# Patient Record
Sex: FEMALE | Race: BLACK OR AFRICAN AMERICAN | Employment: UNEMPLOYED | URBAN - METROPOLITAN AREA
[De-identification: names, ages, dates, MRNs, and addresses within clinical notes are randomized per-mention and may not be internally consistent; named-entity substitution may affect disease eponyms.]

---

## 2017-02-02 ENCOUNTER — HOSPITAL ENCOUNTER (EMERGENCY)
Age: 42
Discharge: HOME | End: 2017-02-02
Attending: EMERGENCY MEDICINE
Payer: MEDICAID

## 2017-02-02 ENCOUNTER — APPOINTMENT (OUTPATIENT)
Dept: GENERAL RADIOLOGY | Age: 42
End: 2017-02-02
Attending: EMERGENCY MEDICINE
Payer: MEDICAID

## 2017-02-02 VITALS
SYSTOLIC BLOOD PRESSURE: 161 MMHG | OXYGEN SATURATION: 99 % | HEART RATE: 99 BPM | DIASTOLIC BLOOD PRESSURE: 134 MMHG | WEIGHT: 212 LBS | HEIGHT: 67 IN | BODY MASS INDEX: 33.27 KG/M2 | RESPIRATION RATE: 11 BRPM | TEMPERATURE: 98.6 F

## 2017-02-02 DIAGNOSIS — R73.9 HYPERGLYCEMIA: ICD-10-CM

## 2017-02-02 DIAGNOSIS — R07.89 ATYPICAL CHEST PAIN: Primary | ICD-10-CM

## 2017-02-02 LAB
ANION GAP BLD CALC-SCNC: 12 MMOL/L (ref 5–15)
BASOPHILS # BLD AUTO: 0 K/UL (ref 0–0.1)
BASOPHILS # BLD: 0 % (ref 0–1)
BUN SERPL-MCNC: 9 MG/DL (ref 6–20)
BUN/CREAT SERPL: 10 (ref 12–20)
CALCIUM SERPL-MCNC: 9.7 MG/DL (ref 8.5–10.1)
CHLORIDE SERPL-SCNC: 105 MMOL/L (ref 97–108)
CO2 SERPL-SCNC: 26 MMOL/L (ref 21–32)
CREAT SERPL-MCNC: 0.88 MG/DL (ref 0.55–1.02)
D DIMER PPP FEU-MCNC: 0.19 MG/L FEU (ref 0–0.65)
EOSINOPHIL # BLD: 0.1 K/UL (ref 0–0.4)
EOSINOPHIL NFR BLD: 1 % (ref 0–7)
ERYTHROCYTE [DISTWIDTH] IN BLOOD BY AUTOMATED COUNT: 13.8 % (ref 11.5–14.5)
GLUCOSE SERPL-MCNC: 408 MG/DL (ref 65–100)
HCT VFR BLD AUTO: 35.3 % (ref 35–47)
HGB BLD-MCNC: 12.2 G/DL (ref 11.5–16)
LYMPHOCYTES # BLD AUTO: 28 % (ref 12–49)
LYMPHOCYTES # BLD: 2 K/UL (ref 0.8–3.5)
MCH RBC QN AUTO: 29.2 PG (ref 26–34)
MCHC RBC AUTO-ENTMCNC: 34.6 G/DL (ref 30–36.5)
MCV RBC AUTO: 84.4 FL (ref 80–99)
MONOCYTES # BLD: 0.4 K/UL (ref 0–1)
MONOCYTES NFR BLD AUTO: 6 % (ref 5–13)
NEUTS SEG # BLD: 4.6 K/UL (ref 1.8–8)
NEUTS SEG NFR BLD AUTO: 65 % (ref 32–75)
PLATELET # BLD AUTO: 229 K/UL (ref 150–400)
POTASSIUM SERPL-SCNC: 3.7 MMOL/L (ref 3.5–5.1)
RBC # BLD AUTO: 4.18 M/UL (ref 3.8–5.2)
SODIUM SERPL-SCNC: 143 MMOL/L (ref 136–145)
TROPONIN I SERPL-MCNC: <0.04 NG/ML
WBC # BLD AUTO: 7.1 K/UL (ref 3.6–11)

## 2017-02-02 PROCEDURE — 74011250637 HC RX REV CODE- 250/637: Performed by: EMERGENCY MEDICINE

## 2017-02-02 PROCEDURE — 80048 BASIC METABOLIC PNL TOTAL CA: CPT | Performed by: EMERGENCY MEDICINE

## 2017-02-02 PROCEDURE — 85379 FIBRIN DEGRADATION QUANT: CPT | Performed by: EMERGENCY MEDICINE

## 2017-02-02 PROCEDURE — 71020 XR CHEST PA LAT: CPT

## 2017-02-02 PROCEDURE — 84484 ASSAY OF TROPONIN QUANT: CPT | Performed by: EMERGENCY MEDICINE

## 2017-02-02 PROCEDURE — 85025 COMPLETE CBC W/AUTO DIFF WBC: CPT | Performed by: EMERGENCY MEDICINE

## 2017-02-02 PROCEDURE — 36600 WITHDRAWAL OF ARTERIAL BLOOD: CPT

## 2017-02-02 PROCEDURE — 36415 COLL VENOUS BLD VENIPUNCTURE: CPT | Performed by: EMERGENCY MEDICINE

## 2017-02-02 PROCEDURE — 99284 EMERGENCY DEPT VISIT MOD MDM: CPT

## 2017-02-02 PROCEDURE — 93005 ELECTROCARDIOGRAM TRACING: CPT

## 2017-02-02 RX ORDER — SODIUM CHLORIDE 0.9 % (FLUSH) 0.9 %
5-10 SYRINGE (ML) INJECTION AS NEEDED
Status: DISCONTINUED | OUTPATIENT
Start: 2017-02-02 | End: 2017-02-02 | Stop reason: HOSPADM

## 2017-02-02 RX ORDER — SODIUM CHLORIDE 0.9 % (FLUSH) 0.9 %
5-10 SYRINGE (ML) INJECTION EVERY 8 HOURS
Status: DISCONTINUED | OUTPATIENT
Start: 2017-02-02 | End: 2017-02-02 | Stop reason: HOSPADM

## 2017-02-02 RX ORDER — ACETAMINOPHEN 500 MG
1000 TABLET ORAL
Status: COMPLETED | OUTPATIENT
Start: 2017-02-02 | End: 2017-02-02

## 2017-02-02 RX ADMIN — ACETAMINOPHEN 1000 MG: 500 TABLET ORAL at 10:47

## 2017-02-02 NOTE — ED TRIAGE NOTES
Patient arrived by ems c/o chest pain and sob that is chronic by worse this morning, patient seed in an ed on sat for the same, dx pericarditis, has not f/u with pcp. Patient is resting in bed, bed in low position, call bell in reach, monitor x3, son at bedside.

## 2017-02-02 NOTE — DISCHARGE INSTRUCTIONS
Chest Pain: Care Instructions  Your Care Instructions  There are many things that can cause chest pain. Some are not serious and will get better on their own in a few days. But some kinds of chest pain need more testing and treatment. Your doctor may have recommended a follow-up visit in the next 8 to 12 hours. If you are not getting better, you may need more tests or treatment. Even though your doctor has released you, you still need to watch for any problems. The doctor carefully checked you, but sometimes problems can develop later. If you have new symptoms or if your symptoms do not get better, get medical care right away. If you have worse or different chest pain or pressure that lasts more than 5 minutes or you passed out (lost consciousness), call 911 or seek other emergency help right away. A medical visit is only one step in your treatment. Even if you feel better, you still need to do what your doctor recommends, such as going to all suggested follow-up appointments and taking medicines exactly as directed. This will help you recover and help prevent future problems. How can you care for yourself at home? · Rest until you feel better. · Take your medicine exactly as prescribed. Call your doctor if you think you are having a problem with your medicine. · Do not drive after taking a prescription pain medicine. When should you call for help? Call 911 if:  · You passed out (lost consciousness). · You have severe difficulty breathing. · You have symptoms of a heart attack. These may include:  ¨ Chest pain or pressure, or a strange feeling in your chest.  ¨ Sweating. ¨ Shortness of breath. ¨ Nausea or vomiting. ¨ Pain, pressure, or a strange feeling in your back, neck, jaw, or upper belly or in one or both shoulders or arms. ¨ Lightheadedness or sudden weakness. ¨ A fast or irregular heartbeat.   After you call 911, the  may tell you to chew 1 adult-strength or 2 to 4 low-dose aspirin. Wait for an ambulance. Do not try to drive yourself. Call your doctor today if:  · You have any trouble breathing. · Your chest pain gets worse. · You are dizzy or lightheaded, or you feel like you may faint. · You are not getting better as expected. · You are having new or different chest pain. Where can you learn more? Go to http://adamaris-liana.info/. Enter A120 in the search box to learn more about \"Chest Pain: Care Instructions. \"  Current as of: May 27, 2016  Content Version: 11.1  © 6879-1563 [employer]. Care instructions adapted under license by [employer] (which disclaims liability or warranty for this information). If you have questions about a medical condition or this instruction, always ask your healthcare professional. [address] any warranty or liability for your use of this information. Learning About High Blood Sugar  What is high blood sugar? Your body turns the food you eat into glucose (sugar), which it uses for energy. But if your body isn't able to use the sugar right away, it can build up in your blood and lead to high blood sugar. When the amount of sugar in your blood stays too high for too much of the time, you may have diabetes. Diabetes is a disease that can cause serious health problems. The good news is that lifestyle changes may help you get your blood sugar back to normal and avoid or delay diabetes. What causes high blood sugar? Sugar (glucose) can build up in your blood if you:  · Are overweight. · Have a family history of diabetes. · Take certain medicines, such as steroids. What are the symptoms? Having high blood sugar may not cause any symptoms at all. Or it may make you feel very thirsty or very hungry. You may also urinate more often than usual, have blurry vision, or lose weight without trying. How is high blood sugar treated?   You can take steps to lower your blood sugar level if you understand what makes it get higher. Your doctor may want you to learn how to test your blood sugar level at home. Then you can see how illness, stress, or different kinds of food or medicine raise or lower your blood sugar level. Other tests may be needed to see if you have diabetes. How can you prevent high blood sugar? · Watch your weight. If you're overweight, losing just a small amount of weight may help. Reducing fat around your waist is most important. · Limit the amount of calories, sweets, and unhealthy fat you eat. Ask your doctor if a dietitian can help you. A registered dietitian can help you create meal plans that fit your lifestyle. · Get at least 30 minutes of exercise on most days of the week. Exercise helps control your blood sugar. It also helps you maintain a healthy weight. Walking is a good choice. You also may want to do other activities, such as running, swimming, cycling, or playing tennis or team sports. · If your doctor prescribed medicines, take them exactly as prescribed. Call your doctor if you think you are having a problem with your medicine. You will get more details on the specific medicines your doctor prescribes. Follow-up care is a key part of your treatment and safety. Be sure to make and go to all appointments, and call your doctor if you are having problems. It's also a good idea to know your test results and keep a list of the medicines you take. Where can you learn more? Go to http://adamaris-liana.info/. Enter O108 in the search box to learn more about \"Learning About High Blood Sugar. \"  Current as of: May 23, 2016  Content Version: 11.1  © 2527-2756 [employer]. Care instructions adapted under license by [employer] (which disclaims liability or warranty for this information).  If you have questions about a medical condition or this instruction, always ask your healthcare professional. Rigo Alcaraz disclaims any warranty or liability for your use of this information. We hope that we have addressed all of your medical concerns. The examination and treatment you received in the Emergency Department were for an emergent problem and were not intended as complete care. It is important that you follow up with your healthcare provider(s) for ongoing care. If your symptoms worsen or do not improve as expected, and you are unable to reach your usual health care provider(s), you should return to the Emergency Department. Today's healthcare is undergoing tremendous change, and patient satisfaction surveys are one of the many tools to assess the quality of medical care. You may receive a survey from the [employer] regarding your experience in the Emergency Department. I hope that your experience has been completely positive, particularly the medical care that I provided. As such, please participate in the survey; anything less than excellent does not meet my expectations or intentions. [facility] and [address] [place] [address] participate in nationally recognized quality of care measures. If your blood pressure is greater than 120/80, as reported below, we urge that you seek medical care to address the potential of high blood pressure, commonly known as hypertension. Hypertension can be hereditary or can be caused by certain medical conditions, pain, stress, or \"white coat syndrome. \"       Please make an appointment with your health care provider(s) for follow up of your Emergency Department visit. VITALS:   Patient Vitals for the past 8 hrs:   Temp Pulse Resp BP SpO2   02/02/17 1045 - (!) 103 14 (!) 168/116 96 %   02/02/17 0859 98.6 °F (37 °C) 99 16 (!) 136/108 98 %          Thank you for allowing us to provide you with medical care today. We realize that you have many choices for your emergency care needs.   Please choose us in the future for any continued health care needs. Regards,           Master LEIVA Edilia Keenan, [address] [facility] [address]   Office: [phone]            Recent Results (from the past 24 hour(s))   METABOLIC PANEL, BASIC    Collection Time: 02/02/17 10:10 AM   Result Value Ref Range    Sodium 143 136 - 145 mmol/L    Potassium 3.7 3.5 - 5.1 mmol/L    Chloride 105 97 - 108 mmol/L    CO2 26 21 - 32 mmol/L    Anion gap 12 5 - 15 mmol/L    Glucose 408 (H) 65 - 100 mg/dL    BUN 9 6 - 20 MG/DL    Creatinine 0.88 0.55 - 1.02 MG/DL    BUN/Creatinine ratio 10 (L) 12 - 20      GFR est AA >60 >60 ml/min/1.73m2    GFR est non-AA >60 >60 ml/min/1.73m2    Calcium 9.7 8.5 - 10.1 MG/DL   TROPONIN I    Collection Time: 02/02/17 10:10 AM   Result Value Ref Range    Troponin-I, Qt. <0.04 <0.05 ng/mL   D DIMER    Collection Time: 02/02/17 10:10 AM   Result Value Ref Range    D-dimer 0.19 0.00 - 0.65 mg/L FEU   CBC WITH AUTOMATED DIFF    Collection Time: 02/02/17 10:10 AM   Result Value Ref Range    WBC 7.1 3.6 - 11.0 K/uL    RBC 4.18 3.80 - 5.20 M/uL    HGB 12.2 11.5 - 16.0 g/dL    HCT 35.3 35.0 - 47.0 %    MCV 84.4 80.0 - 99.0 FL    MCH 29.2 26.0 - 34.0 PG    MCHC 34.6 30.0 - 36.5 g/dL    RDW 13.8 11.5 - 14.5 %    PLATELET 185 161 - 106 K/uL    NEUTROPHILS 65 32 - 75 %    LYMPHOCYTES 28 12 - 49 %    MONOCYTES 6 5 - 13 %    EOSINOPHILS 1 0 - 7 %    BASOPHILS 0 0 - 1 %    ABS. NEUTROPHILS 4.6 1.8 - 8.0 K/UL    ABS. LYMPHOCYTES 2.0 0.8 - 3.5 K/UL    ABS. MONOCYTES 0.4 0.0 - 1.0 K/UL    ABS. EOSINOPHILS 0.1 0.0 - 0.4 K/UL    ABS. BASOPHILS 0.0 0.0 - 0.1 K/UL       Xr Chest Pa Lat    Result Date: 2/2/2017  INDICATION: Left chest pain since 4:30 this morning COMPARISON: 5/23/2016 FINDINGS: AP and lateral views of the chest demonstrate a stable cardiomediastinal silhouette and clear lungs bilaterally. The visualized osseous structures are unremarkable. IMPRESSION: No acute process.

## 2017-02-02 NOTE — ED NOTES
Dr. Ned Razo reviewed discharge instructions with the patient. The patient verbalized understanding. All questions and concerns were addressed. Arranging transportation for the patient and her son back home.

## 2017-02-02 NOTE — ED NOTES
The patient is discharged ambulatory in the care of family members with instructions and prescriptions in hand. Pt is alert and oriented x 4. Respirations are clear and unlabored. Patient will wait for logisticare ride in the waiting area.

## 2017-02-02 NOTE — ED PROVIDER NOTES
HPI Comments: 46 y.o. female with past medical history significant for diabetes, HTN, morbid obesity, chronic pain, bronchitis, arthritis, GERD, anxiety, CAD, fibromyalgia, DVT, pericarditis who presents via EMS with chief complaint of chest pain. Pt complains of L-sided anterior chest pain and SOB that began at 0430 this morning (4.5 hours ago). Pt states that pain radiates into L-arm and is exacerbate with movement. Additional sx inlcude dizziness and vomiting (x1). Pt mentions h/o chest pain, noting that she chronically experiences constant mild CP. However, her current sx are much worse than usual. Pt denies recent heavy lifting. Pt mentions h/o anxiety and depression; she denies being more depressed/anxious than usual. Pt denies cough, fever. There are no other acute medical concerns at this time. PCP: NANCY Parnell    Note written by Nora Light, as dictated by Reese Horn MD 8:56 AM      The history is provided by the patient. No  was used.         Past Medical History:   Diagnosis Date    Anxiety     Arthritis     Autoimmune disease ([address]     Bronchitis     CAD (coronary artery disease)     Chronic pain      fibromyalgia    Diabetes ([address]     Fibromyalgia     GERD (gastroesophageal reflux disease)     H/O pericarditis     History of continuous positive airway pressure (CPAP) therapy     Hypertension     Morbid obesity ([address]     Other ill-defined conditions(799.89)      sleep apnea    Thromboembolus ([address]      2010 Leg DVT    Unspecified sleep apnea      cpap       Past Surgical History:   Procedure Laterality Date    Hx anai and bso  2005     hysterectomy    Hx heent  2009     tonsillectomy    Hx orthopaedic  2010     right knee patellar surgery with hardware         Family History:   Problem Relation Age of Onset    Diabetes Mother     Hypertension Mother     Diabetes Father     Hypertension Father     Diabetes Sister     Diabetes Sister    Verlinda Smoker Heart Disease Mother     Heart Disease Father     Hypertension Sister        Social History     Social History    Marital status:      Spouse name: N/A    Number of children: N/A    Years of education: N/A     Occupational History    Not on file. Social History Main Topics    Smoking status: Never Smoker    Smokeless tobacco: Never Used    Alcohol use No    Drug use: No    Sexual activity: Not Currently     Other Topics Concern    Not on file     Social History Narrative         ALLERGIES: Aspirin and Nsaids (non-steroidal anti-inflammatory drug)    Review of Systems   Constitutional: Negative. Negative for appetite change, fever and unexpected weight change. HENT: Negative. Negative for ear pain, hearing loss, nosebleeds, rhinorrhea, sore throat and trouble swallowing. Respiratory: Positive for shortness of breath. Negative for cough and chest tightness. Cardiovascular: Positive for chest pain (Lsided). Negative for palpitations. Gastrointestinal: Positive for nausea and vomiting. Negative for abdominal distention, abdominal pain and blood in stool. Endocrine: Negative. Genitourinary: Negative for dysuria and hematuria. Musculoskeletal: Negative. Negative for back pain and myalgias. Skin: Negative. Negative for rash. Allergic/Immunologic: Negative. Neurological: Positive for dizziness. Negative for syncope, weakness and numbness. Hematological: Negative. Psychiatric/Behavioral: Negative. All other systems reviewed and are negative. There were no vitals filed for this visit. Physical Exam   Constitutional: She is oriented to person, place, and time. She appears well-developed and well-nourished. No distress. obese   HENT:   Head: Normocephalic and atraumatic.    Right Ear: External ear normal.   Left Ear: External ear normal.   Nose: Nose normal.   Mouth/Throat: Oropharynx is clear and moist.   Eyes: Conjunctivae and EOM are normal. Pupils are equal, round, and reactive to light. Neck: Normal range of motion. Neck supple. No JVD present. No thyromegaly present. Cardiovascular: Normal rate, regular rhythm, normal heart sounds and intact distal pulses. No murmur heard. Pulmonary/Chest: Effort normal and breath sounds normal. No respiratory distress. She has no wheezes. She has no rales. Abdominal: Soft. Bowel sounds are normal. She exhibits no distension. There is no tenderness. Musculoskeletal: Normal range of motion. She exhibits tenderness. She exhibits no edema. Right lower leg: She exhibits no tenderness and no swelling. Left lower leg: She exhibits no tenderness and no swelling. Mildly reproducible chest wall tenderness above L-breast.    Neurological: She is alert and oriented to person, place, and time. No cranial nerve deficit. Skin: Skin is warm and dry. No rash noted. Psychiatric: Her behavior is normal. Thought content normal. Her mood appears anxious. Note written by Mandy Card. Charla Golden, as dictated by Lois Adan MD 8:56 AM         [facility]  ED Course       Procedures      ED EKG interpretation:  Rhythm: normal sinus rhythm; and regular . Rate (approx.): 89; Axis: normal; ST/T wave: normal; no ectopy  Note written by Rock Jessica Golden, as dictated by Lois Adan MD 8:57 AM    PROGRESS NOTE:  11:03 AM  Pt has elevated BG of 408 with no signs of acidosis. Troponin is negative; D-dimer is negative; CBC is unremarkable; CXR is normal.  Assessment: Pt has atypical chest pain and hyperglycemia. Will d/c home with no prescriptions for pain; pt should f/u with PCP and perform frequent BG checks.

## 2017-02-03 LAB
ATRIAL RATE: 89 BPM
CALCULATED P AXIS, ECG09: 42 DEGREES
CALCULATED R AXIS, ECG10: 10 DEGREES
CALCULATED T AXIS, ECG11: 25 DEGREES
DIAGNOSIS, 93000: NORMAL
P-R INTERVAL, ECG05: 138 MS
Q-T INTERVAL, ECG07: 368 MS
QRS DURATION, ECG06: 88 MS
QTC CALCULATION (BEZET), ECG08: 447 MS
VENTRICULAR RATE, ECG03: 89 BPM

## 2017-03-24 ENCOUNTER — HOSPITAL ENCOUNTER (EMERGENCY)
Age: 42
Discharge: HOME | End: 2017-03-24
Attending: EMERGENCY MEDICINE
Payer: MEDICAID

## 2017-03-24 ENCOUNTER — APPOINTMENT (OUTPATIENT)
Dept: GENERAL RADIOLOGY | Age: 42
End: 2017-03-24
Attending: NURSE PRACTITIONER
Payer: MEDICAID

## 2017-03-24 VITALS
RESPIRATION RATE: 14 BRPM | HEIGHT: 67 IN | BODY MASS INDEX: 33.27 KG/M2 | SYSTOLIC BLOOD PRESSURE: 120 MMHG | WEIGHT: 212 LBS | DIASTOLIC BLOOD PRESSURE: 79 MMHG | HEART RATE: 106 BPM | OXYGEN SATURATION: 97 % | TEMPERATURE: 98.3 F

## 2017-03-24 DIAGNOSIS — R07.89 ATYPICAL CHEST PAIN: Primary | ICD-10-CM

## 2017-03-24 DIAGNOSIS — R73.9 HYPERGLYCEMIA: ICD-10-CM

## 2017-03-24 LAB
ALBUMIN SERPL BCP-MCNC: 3.7 G/DL (ref 3.5–5)
ALBUMIN/GLOB SERPL: 1 {RATIO} (ref 1.1–2.2)
ALP SERPL-CCNC: 137 U/L (ref 45–117)
ALT SERPL-CCNC: 23 U/L (ref 12–78)
ANION GAP BLD CALC-SCNC: 12 MMOL/L (ref 5–15)
APPEARANCE UR: CLEAR
AST SERPL W P-5'-P-CCNC: 14 U/L (ref 15–37)
ATRIAL RATE: 115 BPM
BACTERIA URNS QL MICRO: NEGATIVE /HPF
BASOPHILS # BLD AUTO: 0 K/UL (ref 0–0.1)
BASOPHILS # BLD: 0 % (ref 0–1)
BILIRUB SERPL-MCNC: 0.1 MG/DL (ref 0.2–1)
BILIRUB UR QL: NEGATIVE
BUN SERPL-MCNC: 14 MG/DL (ref 6–20)
BUN/CREAT SERPL: 12 (ref 12–20)
CALCIUM SERPL-MCNC: 9 MG/DL (ref 8.5–10.1)
CALCULATED P AXIS, ECG09: 38 DEGREES
CALCULATED R AXIS, ECG10: 0 DEGREES
CALCULATED T AXIS, ECG11: 6 DEGREES
CHLORIDE SERPL-SCNC: 101 MMOL/L (ref 97–108)
CK SERPL-CCNC: 114 U/L (ref 26–192)
CO2 SERPL-SCNC: 23 MMOL/L (ref 21–32)
COLOR UR: ABNORMAL
CREAT SERPL-MCNC: 1.21 MG/DL (ref 0.55–1.02)
D DIMER PPP FEU-MCNC: 0.34 MG/L FEU (ref 0–0.65)
DIAGNOSIS, 93000: NORMAL
DIFFERENTIAL METHOD BLD: NORMAL
EOSINOPHIL # BLD: 0 K/UL (ref 0–0.4)
EOSINOPHIL NFR BLD: 1 % (ref 0–7)
EPITH CASTS URNS QL MICRO: ABNORMAL /LPF
ERYTHROCYTE [DISTWIDTH] IN BLOOD BY AUTOMATED COUNT: 13.4 % (ref 11.5–14.5)
GLOBULIN SER CALC-MCNC: 3.8 G/DL (ref 2–4)
GLUCOSE BLD STRIP.AUTO-MCNC: 329 MG/DL (ref 65–100)
GLUCOSE SERPL-MCNC: 570 MG/DL (ref 65–100)
GLUCOSE UR STRIP.AUTO-MCNC: >1000 MG/DL
HCT VFR BLD AUTO: 39.2 % (ref 35–47)
HGB BLD-MCNC: 12.9 G/DL (ref 11.5–16)
HGB UR QL STRIP: NEGATIVE
KETONES UR QL STRIP.AUTO: NEGATIVE MG/DL
LEUKOCYTE ESTERASE UR QL STRIP.AUTO: NEGATIVE
LYMPHOCYTES # BLD AUTO: 30 % (ref 12–49)
LYMPHOCYTES # BLD: 1.4 K/UL (ref 0.8–3.5)
MCH RBC QN AUTO: 28.5 PG (ref 26–34)
MCHC RBC AUTO-ENTMCNC: 32.9 G/DL (ref 30–36.5)
MCV RBC AUTO: 86.7 FL (ref 80–99)
MONOCYTES # BLD: 0.3 K/UL (ref 0–1)
MONOCYTES NFR BLD AUTO: 6 % (ref 5–13)
NEUTS SEG # BLD: 3 K/UL (ref 1.8–8)
NEUTS SEG NFR BLD AUTO: 63 % (ref 32–75)
NITRITE UR QL STRIP.AUTO: NEGATIVE
P-R INTERVAL, ECG05: 130 MS
PH UR STRIP: 6 [PH] (ref 5–8)
PLATELET # BLD AUTO: 257 K/UL (ref 150–400)
POTASSIUM SERPL-SCNC: 4 MMOL/L (ref 3.5–5.1)
PROT SERPL-MCNC: 7.5 G/DL (ref 6.4–8.2)
PROT UR STRIP-MCNC: NEGATIVE MG/DL
Q-T INTERVAL, ECG07: 334 MS
QRS DURATION, ECG06: 90 MS
QTC CALCULATION (BEZET), ECG08: 462 MS
RBC # BLD AUTO: 4.52 M/UL (ref 3.8–5.2)
RBC #/AREA URNS HPF: ABNORMAL /HPF (ref 0–5)
SERVICE CMNT-IMP: ABNORMAL
SODIUM SERPL-SCNC: 136 MMOL/L (ref 136–145)
SP GR UR REFRACTOMETRY: 1.01 (ref 1–1.03)
TROPONIN I SERPL-MCNC: <0.04 NG/ML
UROBILINOGEN UR QL STRIP.AUTO: 0.2 EU/DL (ref 0.2–1)
VENTRICULAR RATE, ECG03: 115 BPM
WBC # BLD AUTO: 4.7 K/UL (ref 3.6–11)
WBC URNS QL MICRO: ABNORMAL /HPF (ref 0–4)

## 2017-03-24 PROCEDURE — 80053 COMPREHEN METABOLIC PANEL: CPT | Performed by: NURSE PRACTITIONER

## 2017-03-24 PROCEDURE — 99285 EMERGENCY DEPT VISIT HI MDM: CPT

## 2017-03-24 PROCEDURE — 96375 TX/PRO/DX INJ NEW DRUG ADDON: CPT

## 2017-03-24 PROCEDURE — 85379 FIBRIN DEGRADATION QUANT: CPT | Performed by: NURSE PRACTITIONER

## 2017-03-24 PROCEDURE — 96361 HYDRATE IV INFUSION ADD-ON: CPT

## 2017-03-24 PROCEDURE — 85025 COMPLETE CBC W/AUTO DIFF WBC: CPT | Performed by: NURSE PRACTITIONER

## 2017-03-24 PROCEDURE — 36415 COLL VENOUS BLD VENIPUNCTURE: CPT | Performed by: NURSE PRACTITIONER

## 2017-03-24 PROCEDURE — 82962 GLUCOSE BLOOD TEST: CPT

## 2017-03-24 PROCEDURE — 84484 ASSAY OF TROPONIN QUANT: CPT | Performed by: NURSE PRACTITIONER

## 2017-03-24 PROCEDURE — 82550 ASSAY OF CK (CPK): CPT | Performed by: NURSE PRACTITIONER

## 2017-03-24 PROCEDURE — 71020 XR CHEST PA LAT: CPT

## 2017-03-24 PROCEDURE — 74011250637 HC RX REV CODE- 250/637: Performed by: NURSE PRACTITIONER

## 2017-03-24 PROCEDURE — 81001 URINALYSIS AUTO W/SCOPE: CPT | Performed by: NURSE PRACTITIONER

## 2017-03-24 PROCEDURE — 93005 ELECTROCARDIOGRAM TRACING: CPT

## 2017-03-24 PROCEDURE — 96374 THER/PROPH/DIAG INJ IV PUSH: CPT

## 2017-03-24 PROCEDURE — 74011250636 HC RX REV CODE- 250/636: Performed by: NURSE PRACTITIONER

## 2017-03-24 RX ORDER — DIAZEPAM 5 MG/1
5 TABLET ORAL
Qty: 6 TAB | Refills: 0 | Status: ON HOLD | OUTPATIENT
Start: 2017-03-24 | End: 2017-04-04

## 2017-03-24 RX ORDER — ONDANSETRON 2 MG/ML
4 INJECTION INTRAMUSCULAR; INTRAVENOUS ONCE
Status: COMPLETED | OUTPATIENT
Start: 2017-03-24 | End: 2017-03-24

## 2017-03-24 RX ORDER — MORPHINE SULFATE 2 MG/ML
4 INJECTION, SOLUTION INTRAMUSCULAR; INTRAVENOUS
Status: COMPLETED | OUTPATIENT
Start: 2017-03-24 | End: 2017-03-24

## 2017-03-24 RX ORDER — DIAZEPAM 5 MG/1
5 TABLET ORAL
Status: COMPLETED | OUTPATIENT
Start: 2017-03-24 | End: 2017-03-24

## 2017-03-24 RX ADMIN — DIAZEPAM 5 MG: 5 TABLET ORAL at 13:25

## 2017-03-24 RX ADMIN — HUMAN INSULIN 10 UNITS: 100 INJECTION, SOLUTION SUBCUTANEOUS at 13:25

## 2017-03-24 RX ADMIN — SODIUM CHLORIDE 1000 ML: 900 INJECTION, SOLUTION INTRAVENOUS at 14:21

## 2017-03-24 RX ADMIN — SODIUM CHLORIDE 1000 ML: 900 INJECTION, SOLUTION INTRAVENOUS at 13:18

## 2017-03-24 RX ADMIN — ONDANSETRON 4 MG: 2 INJECTION INTRAMUSCULAR; INTRAVENOUS at 13:08

## 2017-03-24 RX ADMIN — Medication 4 MG: at 12:11

## 2017-03-24 NOTE — ED NOTES
Medicated for nausea and still states has chest pain 8/10. Morphine helped \"a little\". Will inform provider.

## 2017-03-24 NOTE — DISCHARGE INSTRUCTIONS
Musculoskeletal Chest Pain: Care Instructions  Your Care Instructions  Chest pain is not always a sign that something is wrong with your heart or that you have another serious problem. The doctor thinks your chest pain is caused by strained muscles or ligaments, inflamed chest cartilage, or another problem in your chest, rather than by your heart. You may need more tests to find the cause of your chest pain. Follow-up care is a key part of your treatment and safety. Be sure to make and go to all appointments, and call your doctor if you are having problems. Its also a good idea to know your test results and keep a list of the medicines you take. How can you care for yourself at home? · Take pain medicines exactly as directed. ¨ If the doctor gave you a prescription medicine for pain, take it as prescribed. ¨ If you are not taking a prescription pain medicine, ask your doctor if you can take an over-the-counter medicine. · Rest and protect the sore area. · Stop, change, or take a break from any activity that may be causing your pain or soreness. · Put ice or a cold pack on the sore area for 10 to 20 minutes at a time. Try to do this every 1 to 2 hours for the next 3 days (when you are awake) or until the swelling goes down. Put a thin cloth between the ice and your skin. · After 2 or 3 days, apply a heating pad set on low or a warm cloth to the area that hurts. Some doctors suggest that you go back and forth between hot and cold. · Do not wrap or tape your ribs for support. This may cause you to take smaller breaths, which could increase your risk of lung problems. · Mentholated creams such as Bengay or Icy Hot may soothe sore muscles. Follow the instructions on the package. · Follow your doctor's instructions for exercising. · Gentle stretching and massage may help you get better faster. Stretch slowly to the point just before pain begins, and hold the stretch for at least 15 to 30 seconds.  Do this 3 or 4 times a day. Stretch just after you have applied heat. · As your pain gets better, slowly return to your normal activities. Any increased pain may be a sign that you need to rest a while longer. When should you call for help? Call 911 anytime you think you may need emergency care. For example, call if:  · You have chest pain or pressure. This may occur with:  ¨ Sweating. ¨ Shortness of breath. ¨ Nausea or vomiting. ¨ Pain that spreads from the chest to the neck, jaw, or one or both shoulders or arms. ¨ Dizziness or lightheadedness. ¨ A fast or uneven pulse. After calling 911, chew 1 adult-strength aspirin. Wait for an ambulance. Do not try to drive yourself. · You have sudden chest pain and shortness of breath, or you cough up blood. Call your doctor now or seek immediate medical care if:  · You have any trouble breathing. · Your chest pain gets worse. · Your chest pain occurs consistently with exercise and is relieved by rest.  Watch closely for changes in your health, and be sure to contact your doctor if:  · Your chest pain does not get better after 1 week. Where can you learn more? Go to http://adamaris-liana.info/. Enter V293 in the search box to learn more about \"Musculoskeletal Chest Pain: Care Instructions. \"  Current as of: May 27, 2016  Content Version: 11.1  © 3929-0514 [employer]. Care instructions adapted under license by [employer] (which disclaims liability or warranty for this information). If you have questions about a medical condition or this instruction, always ask your healthcare professional. [place] [address] any warranty or liability for your use of this information. Learning About High Blood Sugar  What is high blood sugar? Your body turns the food you eat into glucose (sugar), which it uses for energy.  But if your body isn't able to use the sugar right away, it can build up in your blood and lead to high blood sugar. When the amount of sugar in your blood stays too high for too much of the time, you may have diabetes. Diabetes is a disease that can cause serious health problems. The good news is that lifestyle changes may help you get your blood sugar back to normal and avoid or delay diabetes. What causes high blood sugar? Sugar (glucose) can build up in your blood if you:  · Are overweight. · Have a family history of diabetes. · Take certain medicines, such as steroids. What are the symptoms? Having high blood sugar may not cause any symptoms at all. Or it may make you feel very thirsty or very hungry. You may also urinate more often than usual, have blurry vision, or lose weight without trying. How is high blood sugar treated? You can take steps to lower your blood sugar level if you understand what makes it get higher. Your doctor may want you to learn how to test your blood sugar level at home. Then you can see how illness, stress, or different kinds of food or medicine raise or lower your blood sugar level. Other tests may be needed to see if you have diabetes. How can you prevent high blood sugar? · Watch your weight. If you're overweight, losing just a small amount of weight may help. Reducing fat around your waist is most important. · Limit the amount of calories, sweets, and unhealthy fat you eat. Ask your doctor if a dietitian can help you. A registered dietitian can help you create meal plans that fit your lifestyle. · Get at least 30 minutes of exercise on most days of the week. Exercise helps control your blood sugar. It also helps you maintain a healthy weight. Walking is a good choice. You also may want to do other activities, such as running, swimming, cycling, or playing tennis or team sports. · If your doctor prescribed medicines, take them exactly as prescribed. Call your doctor if you think you are having a problem with your medicine.  You will get more details on the specific medicines your doctor prescribes. Follow-up care is a key part of your treatment and safety. Be sure to make and go to all appointments, and call your doctor if you are having problems. It's also a good idea to know your test results and keep a list of the medicines you take. Where can you learn more? Go to http://adamaris-liana.info/. Enter O108 in the search box to learn more about \"Learning About High Blood Sugar. \"  Current as of: May 23, 2016  Content Version: 11.1  © 5771-5963 [employer]. Care instructions adapted under license by [employer] (which disclaims liability or warranty for this information). If you have questions about a medical condition or this instruction, always ask your healthcare professional. [address] any warranty or liability for your use of this information. We hope that we have addressed all of your medical concerns. The examination and treatment you received in the Emergency Department were for an emergent problem and were not intended as complete care. It is important that you follow up with your healthcare provider(s) for ongoing care. If your symptoms worsen or do not improve as expected, and you are unable to reach your usual health care provider(s), you should return to the Emergency Department. Today's healthcare is undergoing tremendous change, and patient satisfaction surveys are one of the many tools to assess the quality of medical care. You may receive a survey from the [employer] organization regarding your experience in the Emergency Department. I hope that your experience has been completely positive, particularly the medical care that I provided. As such, please participate in the survey; anything less than excellent does not meet my expectations or intentions.         [facility] and [facility] participate in nationally recognized quality of care measures. If your blood pressure is greater than 120/80, as reported below, we urge that you seek medical care to address the potential of high blood pressure, commonly known as hypertension. Hypertension can be hereditary or can be caused by certain medical conditions, pain, stress, or \"white coat syndrome. \"       Please make an appointment with your health care provider(s) for follow up of your Emergency Department visit. VITALS:   Patient Vitals for the past 8 hrs:   Temp Pulse Resp BP SpO2   03/24/17 1310 - 98 14 115/74 95 %   03/24/17 1132 98.3 °F (36.8 °C) (!) 122 20 (!) 126/92 97 %          Thank you for allowing us to provide you with medical care today. We realize that you have many choices for your emergency care needs. Please choose us in the future for any continued health care needs. [address]: [phone]            Recent Results (from the past 24 hour(s))   EKG, 12 LEAD, INITIAL    Collection Time: 03/24/17 11:27 AM   Result Value Ref Range    Ventricular Rate 115 BPM    Atrial Rate 115 BPM    P-R Interval 130 ms    QRS Duration 90 ms    Q-T Interval 334 ms    QTC Calculation (Bezet) 462 ms    Calculated P Axis 38 degrees    Calculated R Axis 0 degrees    Calculated T Axis 6 degrees    Diagnosis       Sinus tachycardia  Otherwise normal ECG  When compared with ECG of 02-FEB-2017 08:57,  No significant change was found     METABOLIC PANEL, COMPREHENSIVE    Collection Time: 03/24/17 11:40 AM   Result Value Ref Range    Sodium 136 136 - 145 mmol/L    Potassium 4.0 3.5 - 5.1 mmol/L    Chloride 101 97 - 108 mmol/L    CO2 23 21 - 32 mmol/L    Anion gap 12 5 - 15 mmol/L    Glucose 570 (H) 65 - 100 mg/dL    BUN 14 6 - 20 MG/DL    Creatinine 1.21 (H) 0.55 - 1.02 MG/DL    BUN/Creatinine ratio 12 12 - 20      GFR est AA >60 >60 ml/min/1.73m2    GFR est non-AA 50 (L) >60 ml/min/1.73m2    Calcium 9.0 8.5 - 10.1 MG/DL    Bilirubin, total 0.1 (L) 0.2 - 1.0 MG/DL    ALT (SGPT) 23 12 - 78 U/L    AST (SGOT) 14 (L) 15 - 37 U/L    Alk. phosphatase 137 (H) 45 - 117 U/L    Protein, total 7.5 6.4 - 8.2 g/dL    Albumin 3.7 3.5 - 5.0 g/dL    Globulin 3.8 2.0 - 4.0 g/dL    A-G Ratio 1.0 (L) 1.1 - 2.2     CBC WITH AUTOMATED DIFF    Collection Time: 03/24/17 11:40 AM   Result Value Ref Range    WBC 4.7 3.6 - 11.0 K/uL    RBC 4.52 3.80 - 5.20 M/uL    HGB 12.9 11.5 - 16.0 g/dL    HCT 39.2 35.0 - 47.0 %    MCV 86.7 80.0 - 99.0 FL    MCH 28.5 26.0 - 34.0 PG    MCHC 32.9 30.0 - 36.5 g/dL    RDW 13.4 11.5 - 14.5 %    PLATELET 374 596 - 956 K/uL    NEUTROPHILS 63 32 - 75 %    LYMPHOCYTES 30 12 - 49 %    MONOCYTES 6 5 - 13 %    EOSINOPHILS 1 0 - 7 %    BASOPHILS 0 0 - 1 %    ABS. NEUTROPHILS 3.0 1.8 - 8.0 K/UL    ABS. LYMPHOCYTES 1.4 0.8 - 3.5 K/UL    ABS. MONOCYTES 0.3 0.0 - 1.0 K/UL    ABS. EOSINOPHILS 0.0 0.0 - 0.4 K/UL    ABS.  BASOPHILS 0.0 0.0 - 0.1 K/UL    DF AUTOMATED     TROPONIN I    Collection Time: 03/24/17 11:40 AM   Result Value Ref Range    Troponin-I, Qt. <0.04 <0.08 ng/mL   CK W/ REFLX CKMB    Collection Time: 03/24/17 11:40 AM   Result Value Ref Range     26 - 192 U/L   D DIMER    Collection Time: 03/24/17 11:40 AM   Result Value Ref Range    D-dimer 0.34 0.00 - 0.65 mg/L FEU   URINALYSIS W/MICROSCOPIC    Collection Time: 03/24/17 12:16 PM   Result Value Ref Range    Color YELLOW/STRAW      Appearance CLEAR CLEAR      Specific gravity 1.010 1.003 - 1.030      pH (UA) 6.0 5.0 - 8.0      Protein NEGATIVE  NEG mg/dL    Glucose >1000 (A) NEG mg/dL    Ketone NEGATIVE  NEG mg/dL    Bilirubin NEGATIVE  NEG      Blood NEGATIVE  NEG      Urobilinogen 0.2 0.2 - 1.0 EU/dL    Nitrites NEGATIVE  NEG      Leukocyte Esterase NEGATIVE  NEG      WBC 5-10 0 - 4 /hpf    RBC 0-5 0 - 5 /hpf    Epithelial cells MANY (A) FEW /lpf    Bacteria NEGATIVE  NEG /hpf   GLUCOSE, POC    Collection Time: 03/24/17  2:18 PM   Result Value Ref Range Glucose (POC) 329 (H) 65 - 100 mg/dL    Performed by Chu Meier        Xr Chest Pa Lat    Result Date: 3/24/2017  Exam:  2 view chest Indication: Left upper chest pain and shortness of breath today Comparison to 2/2/2017. PA and lateral views demonstrate normal heart size. There is no acute process in the lung fields. The osseous structures are unremarkable.      Impression: No acute process or change compared to the prior exam.

## 2017-03-24 NOTE — ED NOTES
Plan of care and all test/meds ordered explained to pt. Good understanding and agreement with plan was verbalized.

## 2017-03-24 NOTE — ED NOTES
Purposeful rounding done. Pt sitting up on stretcher. Offered assist with any needs. Medications with rational explained and pt medicated with insulin and valium as ordered, IVF infusing well. \" Call bell in reach will continue to monitor.

## 2017-03-24 NOTE — ED PROVIDER NOTES
HPI Comments: Trisha Edwards is a 45 yo BF presenting to ED via car with c/o that this morning about 1:00 am she was awakened by chest pain to the left upper area over her breast, she also experienced some SOB. She denies any N/V with the pain. She has had pain like this before when she pericarditis. Pt states that the pain continues as it began this morning she has not taken anything for the pain she has been at home resting. PCP: Yancey Ahumada, PA    There were no other complaints, changes, physical findings at this time. Patient is a 46 y.o. female presenting with chest pain. The history is provided by the patient. Chest Pain (Angina)    This is a chronic problem. The current episode started 6 to 12 hours ago. The problem has been gradually worsening. The problem occurs constantly. The pain is associated with exertion, breathing and movement. The pain is present in the left side. The pain is at a severity of 8/10. The pain is moderate. The quality of the pain is described as sharp and stabbing. The pain does not radiate. The symptoms are aggravated by movement and certain positions. Pertinent negatives include no abdominal pain, no diaphoresis, no dizziness, no fever, no headaches, no nausea, no shortness of breath, no vomiting and no weakness. She has tried nothing for the symptoms. The treatment provided no relief.         Past Medical History:   Diagnosis Date    Anxiety     Arthritis     Autoimmune disease ([address]     Bronchitis     CAD (coronary artery disease)     Chronic pain     fibromyalgia    Diabetes ([address]     Fibromyalgia     GERD (gastroesophageal reflux disease)     H/O pericarditis     High cholesterol     History of continuous positive airway pressure (CPAP) therapy     Hypertension     Irregular heart beat     Morbid obesity ([address]     Other ill-defined conditions(799.89)     sleep apnea    Thromboembolus ([address]     2010 Leg DVT    Unspecified sleep apnea cpap       Past Surgical History:   Procedure Laterality Date    HX HEENT  2009    tonsillectomy    HX ORTHOPAEDIC  2010    right knee patellar surgery with hardware    HX IRVING AND BSO  2005    hysterectomy         Family History:   Problem Relation Age of Onset    Diabetes Mother     Hypertension Mother     Heart Disease Mother     Diabetes Father     Hypertension Father     Heart Disease Father     Diabetes Sister     Diabetes Sister     Hypertension Sister        Social History     Social History    Marital status:      Spouse name: N/A    Number of children: N/A    Years of education: N/A     Occupational History    Not on file. Social History Main Topics    Smoking status: Never Smoker    Smokeless tobacco: Never Used    Alcohol use No    Drug use: No    Sexual activity: Not Currently     Other Topics Concern    Not on file     Social History Narrative         ALLERGIES: Aspirin and Nsaids (non-steroidal anti-inflammatory drug)    Review of Systems   Constitutional: Negative. Negative for chills, diaphoresis and fever. HENT: Negative. Negative for congestion, rhinorrhea and trouble swallowing. Eyes: Negative. Respiratory: Negative. Negative for shortness of breath. Cardiovascular: Positive for chest pain. Gastrointestinal: Negative. Negative for abdominal pain, nausea and vomiting. Endocrine: Negative. Musculoskeletal: Negative for arthralgias, myalgias, neck pain and neck stiffness. Skin: Negative. Negative for rash. Allergic/Immunologic: Negative. Neurological: Negative. Negative for dizziness, syncope, weakness and headaches. Hematological: Negative. Psychiatric/Behavioral: Negative.         Vitals:    03/24/17 1330 03/24/17 1400 03/24/17 1445 03/24/17 1500   BP: 114/78 113/75 114/79 120/79   Pulse: (!) 111 (!) 110 (!) 109 (!) 106   Resp: 19 18 15 14   Temp:       SpO2: 94% 98% 100% 97%   Weight:       Height:                Physical Exam Constitutional: She is oriented to person, place, and time. Vital signs are normal. She appears well-developed and well-nourished. Non-toxic appearance. She does not have a sickly appearance. She does not appear ill. HENT:   Head: Normocephalic and atraumatic. Eyes: Conjunctivae, EOM and lids are normal. Pupils are equal, round, and reactive to light. Neck: Trachea normal, normal range of motion and full passive range of motion without pain. Neck supple. Cardiovascular: Normal rate, regular rhythm, normal heart sounds and normal pulses. Pulmonary/Chest: Effort normal and breath sounds normal.   Abdominal: Soft. Normal appearance and bowel sounds are normal.   Musculoskeletal: Normal range of motion. Neurological: She is alert and oriented to person, place, and time. She has normal strength. GCS eye subscore is 4. GCS verbal subscore is 5. GCS motor subscore is 6. Skin: Skin is warm, dry and intact. Psychiatric: She has a normal mood and affect. Her speech is normal and behavior is normal. Judgment and thought content normal. Cognition and memory are normal.   Nursing note and vitals reviewed. MDM  Number of Diagnoses or Management Options  Atypical chest pain:   Hyperglycemia:   Diagnosis management comments: DDx- Atypical chest pain, PE, ACS, stress       Amount and/or Complexity of Data Reviewed  Clinical lab tests: ordered  Tests in the radiology section of CPT®: ordered  Discuss the patient with other providers: yes ([place])    Risk of Complications, Morbidity, and/or Mortality  Presenting problems: moderate  Diagnostic procedures: moderate  Management options: low    Patient Progress  Patient progress: improved    ED Course       Procedures     EKG interpretation: (Preliminary)  Rhythm: sinus tachycardia; and regular . Rate (approx.): 115; Axis: normal; P wave: normal; QRS interval: normal ; ST/T wave: normal; in  Lead: II; Other findings: normal.    The patient Well's Score is 1.5. It is calculated by the the followin DVT/PE (1.5pts), 1.5 HR >100 (1.5pts), 0 surgery within 4 weeks or immobilization in last 3 days (1.5pts), 0 clinical signs of DVT (3pts), 0 hemoptysis(1pt), 0 malignancy with treatment within 6 months or palliative care (1pt). Patient with total score of < 2 are LOW RISK, 2-6 are INTERMEDIATE RISK, and >6 are HIGH RISK.     1:46 PM  I have just reevaluated the patient. I have reviewed Her vital signs and determined there is currently no worsening in their condition or physical exam.  Results have been reviewed with them and their questions have been answered. We will continue to review further results as they come available. I have explained to the patient that she will need to f/u with her PCP and that I will also give her a referral for a new PCP.       Jennifer Mitchell FNP-BC    LABORATORY TESTS:  Recent Results (from the past 12 hour(s))   EKG, 12 LEAD, INITIAL    Collection Time: 17 11:27 AM   Result Value Ref Range    Ventricular Rate 115 BPM    Atrial Rate 115 BPM    P-R Interval 130 ms    QRS Duration 90 ms    Q-T Interval 334 ms    QTC Calculation (Bezet) 462 ms    Calculated P Axis 38 degrees    Calculated R Axis 0 degrees    Calculated T Axis 6 degrees    Diagnosis       Sinus tachycardia  Otherwise normal ECG  When compared with ECG of 2017 08:57,  No significant change was found     METABOLIC PANEL, COMPREHENSIVE    Collection Time: 17 11:40 AM   Result Value Ref Range    Sodium 136 136 - 145 mmol/L    Potassium 4.0 3.5 - 5.1 mmol/L    Chloride 101 97 - 108 mmol/L    CO2 23 21 - 32 mmol/L    Anion gap 12 5 - 15 mmol/L    Glucose 570 (H) 65 - 100 mg/dL    BUN 14 6 - 20 MG/DL    Creatinine 1.21 (H) 0.55 - 1.02 MG/DL    BUN/Creatinine ratio 12 12 - 20      GFR est AA >60 >60 ml/min/1.73m2    GFR est non-AA 50 (L) >60 ml/min/1.73m2    Calcium 9.0 8.5 - 10.1 MG/DL    Bilirubin, total 0.1 (L) 0.2 - 1.0 MG/DL    ALT (SGPT) 23 12 - 78 U/L    AST (SGOT) 14 (L) 15 - 37 U/L    Alk. phosphatase 137 (H) 45 - 117 U/L    Protein, total 7.5 6.4 - 8.2 g/dL    Albumin 3.7 3.5 - 5.0 g/dL    Globulin 3.8 2.0 - 4.0 g/dL    A-G Ratio 1.0 (L) 1.1 - 2.2     CBC WITH AUTOMATED DIFF    Collection Time: 03/24/17 11:40 AM   Result Value Ref Range    WBC 4.7 3.6 - 11.0 K/uL    RBC 4.52 3.80 - 5.20 M/uL    HGB 12.9 11.5 - 16.0 g/dL    HCT 39.2 35.0 - 47.0 %    MCV 86.7 80.0 - 99.0 FL    MCH 28.5 26.0 - 34.0 PG    MCHC 32.9 30.0 - 36.5 g/dL    RDW 13.4 11.5 - 14.5 %    PLATELET 874 259 - 457 K/uL    NEUTROPHILS 63 32 - 75 %    LYMPHOCYTES 30 12 - 49 %    MONOCYTES 6 5 - 13 %    EOSINOPHILS 1 0 - 7 %    BASOPHILS 0 0 - 1 %    ABS. NEUTROPHILS 3.0 1.8 - 8.0 K/UL    ABS. LYMPHOCYTES 1.4 0.8 - 3.5 K/UL    ABS. MONOCYTES 0.3 0.0 - 1.0 K/UL    ABS. EOSINOPHILS 0.0 0.0 - 0.4 K/UL    ABS.  BASOPHILS 0.0 0.0 - 0.1 K/UL    DF AUTOMATED     TROPONIN I    Collection Time: 03/24/17 11:40 AM   Result Value Ref Range    Troponin-I, Qt. <0.04 <0.08 ng/mL   CK W/ REFLX CKMB    Collection Time: 03/24/17 11:40 AM   Result Value Ref Range     26 - 192 U/L   D DIMER    Collection Time: 03/24/17 11:40 AM   Result Value Ref Range    D-dimer 0.34 0.00 - 0.65 mg/L FEU   URINALYSIS W/MICROSCOPIC    Collection Time: 03/24/17 12:16 PM   Result Value Ref Range    Color YELLOW/STRAW      Appearance CLEAR CLEAR      Specific gravity 1.010 1.003 - 1.030      pH (UA) 6.0 5.0 - 8.0      Protein NEGATIVE  NEG mg/dL    Glucose >1000 (A) NEG mg/dL    Ketone NEGATIVE  NEG mg/dL    Bilirubin NEGATIVE  NEG      Blood NEGATIVE  NEG      Urobilinogen 0.2 0.2 - 1.0 EU/dL    Nitrites NEGATIVE  NEG      Leukocyte Esterase NEGATIVE  NEG      WBC 5-10 0 - 4 /hpf    RBC 0-5 0 - 5 /hpf    Epithelial cells MANY (A) FEW /lpf    Bacteria NEGATIVE  NEG /hpf       IMAGING RESULTS:    Exam: 2 view chest     Indication: Left upper chest pain and shortness of breath today     Comparison to 2/2/2017.     PA and lateral views demonstrate normal heart size. There is no acute process in  the lung fields. The osseous structures are unremarkable.     IMPRESSION  Impression: No acute process or change compared to the prior exam.    MEDICATIONS GIVEN:  Medications   morphine injection 4 mg (4 mg IntraVENous Given 3/24/17 1211)   ondansetron (ZOFRAN) injection 4 mg (4 mg IntraVENous Given 3/24/17 1308)   diazePAM (VALIUM) tablet 5 mg (5 mg Oral Given 3/24/17 1325)   insulin regular (NOVOLIN R, HUMULIN R) injection 10 Units (10 Units IntraVENous Given 3/24/17 1325)   sodium chloride 0.9 % bolus infusion 1,000 mL (0 mL IntraVENous IV Completed 3/24/17 1421)   sodium chloride 0.9 % bolus infusion 1,000 mL (0 mL IntraVENous IV Completed 3/24/17 1513)       IMPRESSION:  1. Atypical chest pain        PLAN:  1. Valium  (6 tabs) for anxiety  2. F/U with Dr Angela Bruce   Return to ED if worse    Discharge Note  3:02 PM  The patient is ready for discharge. The patient's signs, symptoms, diagnosis, and discharge instructions have been discussed and the patient has conveyed their understanding. The patient is to follow up as recommended or return to the ER should their symptoms worsen. Plan has been discussed and the patient is in agreement. Lana Mitchell FNP-BC.

## 2017-03-24 NOTE — ED TRIAGE NOTES
Pt assisted to treatment area via wheelchair she states that this morning about 1:00 am she was awakened by chest pain to the left upper area over her breast, she also experienced some SOB. She denies any N/V with the pain. She has had pain like this before when she pericarditis. Pt states that the pain continues as it began this morning she has not taken anything for the pain she has been at home resting.

## 2017-03-24 NOTE — ED NOTES
Purposeful rounding done. Pt sitting up on stretcher. Offered assist with any needs. Pt states \"pain level 9/10 left chest. Medicated with Morphine as ordered. Pt states no further needs at this time. \" Call bell in reach will continue to monitor.

## 2017-04-03 ENCOUNTER — HOSPITAL ENCOUNTER (OUTPATIENT)
Age: 42
Setting detail: OBSERVATION
Discharge: HOME | End: 2017-04-05
Attending: EMERGENCY MEDICINE | Admitting: INTERNAL MEDICINE
Payer: MEDICAID

## 2017-04-03 DIAGNOSIS — R07.9 CHEST PAIN, UNSPECIFIED TYPE: Primary | ICD-10-CM

## 2017-04-03 LAB
ALBUMIN SERPL BCP-MCNC: 3.4 G/DL (ref 3.5–5)
ALBUMIN/GLOB SERPL: 0.9 {RATIO} (ref 1.1–2.2)
ALP SERPL-CCNC: 103 U/L (ref 45–117)
ALT SERPL-CCNC: 23 U/L (ref 12–78)
ANION GAP BLD CALC-SCNC: 12 MMOL/L (ref 5–15)
AST SERPL W P-5'-P-CCNC: 12 U/L (ref 15–37)
BASOPHILS # BLD AUTO: 0 K/UL (ref 0–0.1)
BASOPHILS # BLD: 0 % (ref 0–1)
BILIRUB SERPL-MCNC: 0.1 MG/DL (ref 0.2–1)
BUN SERPL-MCNC: 8 MG/DL (ref 6–20)
BUN/CREAT SERPL: 9 (ref 12–20)
CALCIUM SERPL-MCNC: 8.4 MG/DL (ref 8.5–10.1)
CHLORIDE SERPL-SCNC: 106 MMOL/L (ref 97–108)
CO2 SERPL-SCNC: 22 MMOL/L (ref 21–32)
CREAT SERPL-MCNC: 0.94 MG/DL (ref 0.55–1.02)
D DIMER PPP FEU-MCNC: 0.3 MG/L FEU (ref 0–0.65)
EOSINOPHIL # BLD: 0.1 K/UL (ref 0–0.4)
EOSINOPHIL NFR BLD: 1 % (ref 0–7)
ERYTHROCYTE [DISTWIDTH] IN BLOOD BY AUTOMATED COUNT: 13.3 % (ref 11.5–14.5)
GLOBULIN SER CALC-MCNC: 3.6 G/DL (ref 2–4)
GLUCOSE SERPL-MCNC: 228 MG/DL (ref 65–100)
HCT VFR BLD AUTO: 36.1 % (ref 35–47)
HGB BLD-MCNC: 12.1 G/DL (ref 11.5–16)
LYMPHOCYTES # BLD AUTO: 39 % (ref 12–49)
LYMPHOCYTES # BLD: 2.1 K/UL (ref 0.8–3.5)
MCH RBC QN AUTO: 28.8 PG (ref 26–34)
MCHC RBC AUTO-ENTMCNC: 33.5 G/DL (ref 30–36.5)
MCV RBC AUTO: 86 FL (ref 80–99)
MONOCYTES # BLD: 0.3 K/UL (ref 0–1)
MONOCYTES NFR BLD AUTO: 5 % (ref 5–13)
NEUTS SEG # BLD: 2.9 K/UL (ref 1.8–8)
NEUTS SEG NFR BLD AUTO: 55 % (ref 32–75)
PLATELET # BLD AUTO: 217 K/UL (ref 150–400)
POTASSIUM SERPL-SCNC: 3.6 MMOL/L (ref 3.5–5.1)
PROT SERPL-MCNC: 7 G/DL (ref 6.4–8.2)
RBC # BLD AUTO: 4.2 M/UL (ref 3.8–5.2)
SODIUM SERPL-SCNC: 140 MMOL/L (ref 136–145)
TROPONIN I SERPL-MCNC: <0.04 NG/ML
WBC # BLD AUTO: 5.4 K/UL (ref 3.6–11)

## 2017-04-03 PROCEDURE — 99285 EMERGENCY DEPT VISIT HI MDM: CPT

## 2017-04-03 PROCEDURE — 85025 COMPLETE CBC W/AUTO DIFF WBC: CPT | Performed by: EMERGENCY MEDICINE

## 2017-04-03 PROCEDURE — 85379 FIBRIN DEGRADATION QUANT: CPT | Performed by: EMERGENCY MEDICINE

## 2017-04-03 PROCEDURE — 74011250637 HC RX REV CODE- 250/637: Performed by: EMERGENCY MEDICINE

## 2017-04-03 PROCEDURE — 80053 COMPREHEN METABOLIC PANEL: CPT | Performed by: EMERGENCY MEDICINE

## 2017-04-03 PROCEDURE — 94762 N-INVAS EAR/PLS OXIMTRY CONT: CPT

## 2017-04-03 PROCEDURE — 96372 THER/PROPH/DIAG INJ SC/IM: CPT

## 2017-04-03 PROCEDURE — 36415 COLL VENOUS BLD VENIPUNCTURE: CPT | Performed by: EMERGENCY MEDICINE

## 2017-04-03 PROCEDURE — 96374 THER/PROPH/DIAG INJ IV PUSH: CPT

## 2017-04-03 PROCEDURE — 84484 ASSAY OF TROPONIN QUANT: CPT | Performed by: EMERGENCY MEDICINE

## 2017-04-03 PROCEDURE — 93005 ELECTROCARDIOGRAM TRACING: CPT

## 2017-04-03 PROCEDURE — 96375 TX/PRO/DX INJ NEW DRUG ADDON: CPT

## 2017-04-03 RX ORDER — NITROGLYCERIN 0.4 MG/1
0.4 TABLET SUBLINGUAL
Status: COMPLETED | OUTPATIENT
Start: 2017-04-03 | End: 2017-04-03

## 2017-04-03 RX ADMIN — NITROGLYCERIN 0.4 MG: 0.4 TABLET SUBLINGUAL at 23:16

## 2017-04-03 RX ADMIN — NITROGLYCERIN 0.4 MG: 0.4 TABLET SUBLINGUAL at 23:21

## 2017-04-03 RX ADMIN — NITROGLYCERIN 0.4 MG: 0.4 TABLET SUBLINGUAL at 23:11

## 2017-04-03 NOTE — IP AVS SNAPSHOT
Current Discharge Medication List  
  
START taking these medications Dose & Instructions Dispensing Information Comments Morning Noon Evening Bedtime  
 aspirin 81 mg chewable tablet Your last dose was: Your next dose is:    
   
   
 Dose:  81 mg Take 1 Tab by mouth daily. Quantity:  30 Tab Refills:  1  
     
   
   
   
  
 glucose blood VI test strips strip Commonly known as:  FREESTYLE TEST Your last dose was: Your next dose is:    
   
   
 Use as directed Quantity:  100 Strip Refills:  2  
     
   
   
   
  
 insulin lispro 100 unit/mL injection Commonly known as:  HUMALOG Replaces:  insulin lispro 100 unit/mL kwikpen Your last dose was: Your next dose is:    
   
   
 Dose:  6 Units 6 Units by SubCUTAneous route Before breakfast, lunch, and dinner. Quantity:  1 Vial  
Refills:  2 Insulin Syringe-Needle U-100 1 mL 29 gauge x 1/2\" Syrg Commonly known as:  INSULIN SYRINGE Your last dose was: Your next dose is:    
   
   
 Use as directed Quantity:  100 Syringe Refills:  2  
     
   
   
   
  
 lancets 28 gauge Misc Commonly known as:  FREESTYLE LANCETS Your last dose was: Your next dose is:    
   
   
 Use as directed Quantity:  100 Lancet Refills:  2  
     
   
   
   
  
 lisinopril 5 mg tablet Commonly known as:  Chaparrita Orlando Your last dose was: Your next dose is:    
   
   
 Dose:  5 mg Take 1 Tab by mouth daily. Quantity:  30 Tab Refills:  1  
     
   
   
   
  
 oxyCODONE-acetaminophen 5-325 mg per tablet Commonly known as:  PERCOCET Your last dose was: Your next dose is:    
   
   
 Dose:  1 Tab Take 1 Tab by mouth every four (4) hours as needed for Pain. Max Daily Amount: 6 Tabs. Quantity:  20 Tab Refills:  0  
     
   
   
   
  
 pantoprazole 20 mg tablet Commonly known as:  PROTONIX Your last dose was: Your next dose is:    
   
   
 Dose:  20 mg Take 1 Tab by mouth daily. Quantity:  30 Tab Refills:  1 CONTINUE these medications which have CHANGED Dose & Instructions Dispensing Information Comments Morning Noon Evening Bedtime  
 insulin glargine 100 unit/mL (3 mL) pen Commonly known as:  LANTUS SOLOSTAR What changed:   
- how much to take - when to take this Your last dose was: Your next dose is:    
   
   
 Dose:  40 Units 40 Units by SubCUTAneous route Daily (before breakfast). Quantity:  1 Each Refills:  2 CONTINUE these medications which have NOT CHANGED Dose & Instructions Dispensing Information Comments Morning Noon Evening Bedtime AMBIEN 10 mg tablet Generic drug:  zolpidem Your last dose was: Your next dose is:    
   
   
 Dose:  10 mg Take 10 mg by mouth nightly as needed for Sleep. Refills:  0  
     
   
   
   
  
 amitriptyline 75 mg tablet Commonly known as:  ELAVIL Your last dose was: Your next dose is:    
   
   
 Dose:  75 mg Take 75 mg by mouth nightly. Refills:  0  
     
   
   
   
  
 atorvastatin 40 mg tablet Commonly known as:  LIPITOR Your last dose was: Your next dose is:    
   
   
 Dose:  40 mg Take 40 mg by mouth daily. Refills:  0  
     
   
   
   
  
 clonazePAM 0.5 mg tablet Commonly known as:  Yo Schlossman Your last dose was: Your next dose is:    
   
   
 Dose:  0.5 mg Take 0.5 mg by mouth three (3) times daily. Indications: anxiety Refills:  0  
     
   
   
   
  
 TOPAMAX 100 mg tablet Generic drug:  topiramate Your last dose was: Your next dose is:    
   
   
 Dose:  100 mg Take 100 mg by mouth two (2) times a day. Refills:  0 STOP taking these medications   
 insulin lispro 100 unit/mL kwikpen Commonly known as:  HUMALOG Replaced by:  insulin lispro 100 unit/mL injection Where to Get Your Medications Information on where to get these meds will be given to you by the nurse or doctor. ! Ask your nurse or doctor about these medications  
  aspirin 81 mg chewable tablet  
 glucose blood VI test strips strip  
 insulin glargine 100 unit/mL (3 mL) pen  
 insulin lispro 100 unit/mL injection Insulin Syringe-Needle U-100 1 mL 29 gauge x 1/2\" Syrg  
 lancets 28 gauge Misc  
 lisinopril 5 mg tablet  
 oxyCODONE-acetaminophen 5-325 mg per tablet  
 pantoprazole 20 mg tablet

## 2017-04-03 NOTE — IP AVS SNAPSHOT
[address] 
[phone] Patient: Madisyn Kebede MRN: [id number] :1975 You are allergic to the following Allergen Reactions Aspirin Other (comments) Rectal bleed Nsaids (Non-Steroidal Anti-Inflammatory Drug) Shortness of Breath Recent Documentation Height Weight Breastfeeding? BMI OB Status Smoking Status 1.702 m 97.1 kg No 33.52 kg/m2 Hysterectomy Never Smoker Emergency Contacts Name Discharge Info Relation Home Work Mobile KurtDejon DISCHARGE CAREGIVER [3] Spouse [3]   [phone] About your hospitalization You were admitted on:  2017 You last received care in the:  [facility] 3 PRO CARE TELE 2 You were discharged on:  2017 Unit phone number:  [phone] Why you were hospitalized Your primary diagnosis was:  Chest Pain At Rest  
 Your diagnoses also included:  Cad (Coronary Artery Disease), Uncontrolled Diabetes Mellitus (Hcc), Morbid Obesity (Hcc), Htn (Hypertension) Providers Seen During Your Hospitalizations Provider Role Specialty Primary office phone Tamia Garces MD Attending Provider Emergency Medicine [phone] Nino Gunn MD Attending Provider Internal Medicine [phone] Christy Farr MD Attending Provider Internal Medicine [phone] Your Primary Care Physician (PCP) Primary Care Physician Office Phone Office Fax Cinthya Ding [phone] Follow-up Information Follow up With Details Comments Contact Info NANCY Gonzales Schedule an appointment as soon as possible for a visit in 1 week  [address] [place] [address] 
[phone] Current Discharge Medication List  
  
START taking these medications Dose & Instructions Dispensing Information Comments Morning Noon Evening Bedtime aspirin 81 mg chewable tablet Your last dose was: Your next dose is:    
   
   
 Dose:  81 mg Take 1 Tab by mouth daily. Quantity:  30 Tab Refills:  1  
     
   
   
   
  
 glucose blood VI test strips strip Commonly known as:  FREESTYLE TEST Your last dose was: Your next dose is:    
   
   
 Use as directed Quantity:  100 Strip Refills:  2  
     
   
   
   
  
 insulin lispro 100 unit/mL injection Commonly known as:  HUMALOG Replaces:  insulin lispro 100 unit/mL kwikpen Your last dose was: Your next dose is:    
   
   
 Dose:  6 Units 6 Units by SubCUTAneous route Before breakfast, lunch, and dinner. Quantity:  1 Vial  
Refills:  2 Insulin Syringe-Needle U-100 1 mL 29 gauge x 1/2\" Syrg Commonly known as:  INSULIN SYRINGE Your last dose was: Your next dose is:    
   
   
 Use as directed Quantity:  100 Syringe Refills:  2  
     
   
   
   
  
 lancets 28 gauge Misc Commonly known as:  FREESTYLE LANCETS Your last dose was: Your next dose is:    
   
   
 Use as directed Quantity:  100 Lancet Refills:  2  
     
   
   
   
  
 lisinopril 5 mg tablet Commonly known as:  Walker Paulo Your last dose was: Your next dose is:    
   
   
 Dose:  5 mg Take 1 Tab by mouth daily. Quantity:  30 Tab Refills:  1  
     
   
   
   
  
 oxyCODONE-acetaminophen 5-325 mg per tablet Commonly known as:  PERCOCET Your last dose was: Your next dose is:    
   
   
 Dose:  1 Tab Take 1 Tab by mouth every four (4) hours as needed for Pain. Max Daily Amount: 6 Tabs. Quantity:  20 Tab Refills:  0  
     
   
   
   
  
 pantoprazole 20 mg tablet Commonly known as:  PROTONIX Your last dose was: Your next dose is:    
   
   
 Dose:  20 mg Take 1 Tab by mouth daily. Quantity:  30 Tab Refills:  1 CONTINUE these medications which have CHANGED Dose & Instructions Dispensing Information Comments Morning Noon Evening Bedtime  
 insulin glargine 100 unit/mL (3 mL) pen Commonly known as:  LANHALIMA SOLOSTAR What changed:   
- how much to take - when to take this Your last dose was: Your next dose is:    
   
   
 Dose:  40 Units 40 Units by SubCUTAneous route Daily (before breakfast). Quantity:  1 Each Refills:  2 CONTINUE these medications which have NOT CHANGED Dose & Instructions Dispensing Information Comments Morning Noon Evening Bedtime AMBIEN 10 mg tablet Generic drug:  zolpidem Your last dose was: Your next dose is:    
   
   
 Dose:  10 mg Take 10 mg by mouth nightly as needed for Sleep. Refills:  0  
     
   
   
   
  
 amitriptyline 75 mg tablet Commonly known as:  ELAVIL Your last dose was: Your next dose is:    
   
   
 Dose:  75 mg Take 75 mg by mouth nightly. Refills:  0  
     
   
   
   
  
 atorvastatin 40 mg tablet Commonly known as:  LIPITOR Your last dose was: Your next dose is:    
   
   
 Dose:  40 mg Take 40 mg by mouth daily. Refills:  0  
     
   
   
   
  
 clonazePAM 0.5 mg tablet Commonly known as:  Sherrye [place] Your last dose was: Your next dose is:    
   
   
 Dose:  0.5 mg Take 0.5 mg by mouth three (3) times daily. Indications: anxiety Refills:  0  
     
   
   
   
  
 TOPAMAX 100 mg tablet Generic drug:  topiramate Your last dose was: Your next dose is:    
   
   
 Dose:  100 mg Take 100 mg by mouth two (2) times a day. Refills:  0 STOP taking these medications   
 insulin lispro 100 unit/mL kwikpen Commonly known as:  HUMALOG Replaced by:  insulin lispro 100 unit/mL injection Where to Get Your Medications Information on where to get these meds will be given to you by the nurse or doctor. ! Ask your nurse or doctor about these medications  
  aspirin 81 mg chewable tablet  
 glucose blood VI test strips strip  
 insulin glargine 100 unit/mL (3 mL) pen  
 insulin lispro 100 unit/mL injection Insulin Syringe-Needle U-100 1 mL 29 gauge x 1/2\" Syrg  
 lancets 28 gauge Misc  
 lisinopril 5 mg tablet  
 oxyCODONE-acetaminophen 5-325 mg per tablet  
 pantoprazole 20 mg tablet Discharge Instructions HOSPITALIST DISCHARGE INSTRUCTIONS 
NAME: Malinda Saldana :  1975 MRN:  [id number] Date/Time:  2017 2:57 PM 
 
ADMIT DATE: 4/3/2017 DISCHARGE DATE: 2017 ADMITTING DIAGNOSIS: 
Chest pain DISCHARGE DIAGNOSIS: 
Non-cardiac chest pain. Heart cath negative for CAD. Chest CTA negative for Pulmonary embolus MEDICATIONS: 
See after visit summary · It is important that you take the medication exactly as they are prescribed. · Keep your medication in the bottles provided by the pharmacist and keep a list of the medication names, dosages, and times to be taken in your wallet. · Do not take other medications without consulting your doctor Pain Management: per above medications What to do at [facility] Recommended diet:  Diabetic Diet Recommended activity: Activity as tolerated 1) Return to the hospital if you feel worse 2) If you experience any of the following symptoms then please call your primary care physician or return to the emergency room if you cannot get hold of your doctor: 
Fever, chills, nausea, vomiting, diarrhea, change in mentation, falling, bleeding, shortness of breath, chest pain, severe headache, severe abdominal pain, 3) Notice the changes in your diabetes medications 4) follow up with your doctor soon Follow Up: Follow-up Information Follow up With Details Comments Contact Info NANCY Cobos Schedule an appointment as soon as possible for a visit in 1 week  [address] [facility] 
[phone] Information obtained by : 
I understand that if any problems occur once I am at home I am to contact my physician. I understand and acknowledge receipt of the instructions indicated above. Physician's or R.N.'s Signature                                                                  Date/Time Patient or Representative Signature                                                          Date/Time Radial Cardiac Catheterization/Angiography Discharge Instructions It is normal to feel tired the first couple days. Take it easy and follow the physicians instructions. CHECK THE CATHETER INSERTION SITE DAILY: 
 
Remove the wrist dressing 24 hours after the procedure. You may shower 24 hours after the procedure. Wash with soap and water and pat dry. Gentle cleaning of the site with soap and water is sufficient, cover with a dry clean dressing or bandage. Do not apply creams or powders to the area. No soaking the wrist for 3 days. Leave the puncture site open to air after 24 hours post-procedure. CALL THE PHYSICIANS:  
 
If the site becomes red, swollen or feels warm to the touch If there is bleeding or drainage or if there is unusual pain at the radial site. If there is any minor oozing, you may apply a band-aid and remove after 12 hours.   
If the bleeding continues, hold pressure with the middle finger against the puncture site and the thumb against the back of the wrist,call 911 to be transported to the hospital. 
 DO NOT DRIVE YOURSELF, OR HAVE ANYONE ELSE DRIVE YOU  CALL [phone]. ACTIVITY:  
For the first 24 hours do not manipulate the wrist. 
No lifting, pushing or pulling over 3-5 pounds with the affected wrist for 7 daysand no straining the insertion site. Do not life grocery bags or the garbage can, do not run the vacuum  or  for 7 days. Start with short walks as in the hospital and gradually increase as tolerated each day. It is recommended to walk 30 minutes 5-7 days per week. Follow your physicians instructions on activity. Avoid walking outside in extremes of heat or cold. Walk inside when it is cold and windy or hot and humid. Things to keep in mind: 
No driving for at least 24 hours, or as designated by your physician. Limit the number of times you go up and down the stairs Take rests and pace yourself with activity. Be careful and do not strain with bowel movements. MEDICATIONS: 
 
Take all medications as prescribed Call your physician if you have any questions Keep an updated list of your medications with you at all times and give a list to your physician and pharmacist 
 
SIGNS AND SYMPTOMS:  
Be cautious of symptoms of angina or recurrent symptoms such as chest discomfort, unusual shortness of breath or fatigue. These could be symptoms of restenosis, a new blockage or a heart attack. If your symptoms are relieved with rest it is still recommended that you notify your physician of recurrent chest pain or discomfort. For CHEST PAIN or symptoms of angina not relieved with rest:  If the discomfort is not relieved with rest, and you have been prescribed Nitroglycerin, take as directed (taken under the tongue, one at a time 5 minutes apart for a total of 3 doses). If the discomfort is not relieved after the 3rd nitroglycerin, call 911. If you have not been prescribed Nitroglycerin  and your chest discomfort is not relieved with rest, call 911.   
 
AFTER CARE:  
 Follow up with your physician as instructed. Follow a heart healthy diet with proper portion control, daily stress management, daily exercise, blood pressure and cholesterol control , and smoking cessation. Chest Pain: Care Instructions Your Care Instructions There are many things that can cause chest pain. Some are not serious and will get better on their own in a few days. But some kinds of chest pain need more testing and treatment. Your doctor may have recommended a follow-up visit in the next 8 to 12 hours. If you are not getting better, you may need more tests or treatment. Even though your doctor has released you, you still need to watch for any problems. The doctor carefully checked you, but sometimes problems can develop later. If you have new symptoms or if your symptoms do not get better, get medical care right away. If you have worse or different chest pain or pressure that lasts more than 5 minutes or you passed out (lost consciousness), call 911 or seek other emergency help right away. A medical visit is only one step in your treatment. Even if you feel better, you still need to do what your doctor recommends, such as going to all suggested follow-up appointments and taking medicines exactly as directed. This will help you recover and help prevent future problems. How can you care for yourself at home? · Rest until you feel better. · Take your medicine exactly as prescribed. Call your doctor if you think you are having a problem with your medicine. · Do not drive after taking a prescription pain medicine. When should you call for help? Call 911 if: 
· You passed out (lost consciousness). · You have severe difficulty breathing. · You have symptoms of a heart attack. These may include: ¨ Chest pain or pressure, or a strange feeling in your chest. 
¨ Sweating. ¨ Shortness of breath. ¨ Nausea or vomiting. ¨ Pain, pressure, or a strange feeling in your back, neck, jaw, or upper belly or in one or both shoulders or arms. ¨ Lightheadedness or sudden weakness. ¨ A fast or irregular heartbeat. After you call 911, the  may tell you to chew 1 adult-strength or 2 to 4 low-dose aspirin. Wait for an ambulance. Do not try to drive yourself. Call your doctor today if: 
· You have any trouble breathing. · Your chest pain gets worse. · You are dizzy or lightheaded, or you feel like you may faint. · You are not getting better as expected. · You are having new or different chest pain. Where can you learn more? Go to http://adamaris-liana.info/. Enter A120 in the search box to learn more about \"Chest Pain: Care Instructions. \" Current as of: May 27, 2016 Content Version: 11.2 © 0131-9372 [employer]. Care instructions adapted under license by [employer] (which disclaims liability or warranty for this information). If you have questions about a medical condition or this instruction, always ask your healthcare professional. [place] [address] any warranty or liability for your use of this information. Discharge Instructions Attachments/References LISINOPRIL (BY MOUTH) (ENGLISH) ASPIRIN: HEART ATTACK AND STROKE PREVENTION (ENGLISH) DIABETES: HEART DISEASE: GENERAL INFO (ENGLISH) Discharge Orders None [facility] Announcement We are excited to announce that we are making your provider's discharge notes available to you in [employer]. You will see these notes when they are completed and signed by the physician that discharged you from your recent hospital stay. If you have any questions or concerns about any information you see in [employer], please call the Health Information Department where you were seen or reach out to your Primary Care Provider for more information about your plan of care. Introducing [facility]! Alpa Hawthorne introduces [employer] patient portal. Now you can access parts of your medical record, email your doctor's office, and request medication refills online. 1. In your internet browser, go to https://[employer]. [employer] 2. Click on the First Time User? Click Here link in the Sign In box. You will see the New Member Sign Up page. 3. Enter your [employer] Access Code exactly as it appears below. You will not need to use this code after youve completed the sign-up process. If you do not sign up before the expiration date, you must request a new code. · [employer] Access Code: [email or web address] Expires: 5/3/2017  9:56 AM 
 
4. Enter the last four digits of your Social Security Number (xxxx) and Date of Birth (mm/dd/yyyy) as indicated and click Submit. You will be taken to the next sign-up page. 5. Create a [employer] ID. This will be your [employer] login ID and cannot be changed, so think of one that is secure and easy to remember. 6. Create a [employer] password. You can change your password at any time. 7. Enter your Password Reset Question and Answer. This can be used at a later time if you forget your password. 8. Enter your e-mail address. You will receive e-mail notification when new information is available in [address] 9. Click Sign Up. You can now view and download portions of your medical record. 10. Click the Download Summary menu link to download a portable copy of your medical information. If you have questions, please visit the Frequently Asked Questions section of the [employer] website. Remember, [employer] is NOT to be used for urgent needs. For medical emergencies, dial 911. Now available from your iPhone and Android! General Information Please provide this summary of care documentation to your next provider. Patient Signature:  ____________________________________________________________ Date:  ____________________________________________________________  
  
Keyla Betancur Provider Signature:  ____________________________________________________________ Date:  ____________________________________________________________ More Information Lisinopril (By mouth) Lisinopril (lye-SIN-oh-pril) Treats high blood pressure and heart failure. Also given to reduce the risk of death after a heart attack. This medicine is an ACE inhibitor. Brand Name(s):Prinivil, Zestril There may be other brand names for this medicine. When This Medicine Should Not Be Used: This medicine is not right for everyone. Do not use it if you had an allergic reaction to lisinopril or another ACE inhibitor, or if you are pregnant. How to Use This Medicine:  
Tablet · Take your medicine as directed. Your dose may need to be changed several times to find what works best for you. · Missed dose: Take a dose as soon as you remember. If it is almost time for your next dose, wait until then and take a regular dose. Do not take extra medicine to make up for a missed dose. · Store the medicine in a closed container at room temperature, away from heat, moisture, and direct light. Drugs and Foods to Avoid: Ask your doctor or pharmacist before using any other medicine, including over-the-counter medicines, vitamins, and herbal products. · Do not use this medicine together with aliskiren if you have diabetes. · Some foods and medicines may affect how lisinopril works. Tell your doctor if you are using any of the following: ¨ Aliskiren, everolimus, lithium, sirolimus, temsirolimus ¨ Another blood pressure medicine, including an angiotensin receptor blocker (ARB) ¨ Insulin or diabetes medicine ¨ Diuretic (water pills, including amiloride, spironolactone, triamterene) ¨ NSAID pain or arthritis medicine (including aspirin, celecoxib, diclofenac, ibuprofen, naproxen) · Ask your doctor before you use any medicine, supplement, or salt substitute that contains potassium. Warnings While Using This Medicine: · It is not safe to take this medicine during pregnancy. It could harm an unborn baby. Tell your doctor right away if you become pregnant. · Tell your doctor if you are breastfeeding, or if you have kidney disease, liver disease, diabetes, or heart or blood vessel disease. · This medicine may cause the following problems: ¨ Angioedema (severe swelling) ¨ Kidney problems ¨ Serious liver problems · This medicine could lower your blood pressure too much, especially when you first use it or if you are dehydrated. Stand or sit up slowly if you feel lightheaded or dizzy. · Do not stop using this medicine without asking your doctor, even if you feel well. This medicine will not cure your high blood pressure, but it will help keep it in a normal range. You may have to take blood pressure medicine for the rest of your life. · Tell any doctor or dentist who treats you that you are using this medicine. · Your doctor will do lab tests at regular visits to check on the effects of this medicine. Keep all appointments. · Keep all medicine out of the reach of children. Never share your medicine with anyone. Possible Side Effects While Using This Medicine:  
Call your doctor right away if you notice any of these side effects: · Allergic reaction: Itching or hives, swelling in your face or hands, swelling or tingling in your mouth or throat, chest tightness, trouble breathing · Blistering, peeling, or red skin rash · Change in how much or how often you urinate · Confusion, weakness, uneven heartbeat, trouble breathing, numbness or tingling in your hands, feet, or lips · Dark urine or pale stools, nausea, vomiting, loss of appetite, stomach pain, yellow skin or eyes · Fever, chills, sore throat, body aches · Lightheadedness, dizziness, fainting · Severe stomach pain (with or without nausea or vomiting) If you notice these less serious side effects, talk with your doctor: · Dry cough If you notice other side effects that you think are caused by this medicine, tell your doctor. Call your doctor for medical advice about side effects. You may report side effects to FDA at 9-904-SFO-2504 © 2016 [address] is for End User's use only and may not be sold, redistributed or otherwise used for commercial purposes. The above information is an  only. It is not intended as medical advice for individual conditions or treatments. Talk to your doctor, nurse or pharmacist before following any medical regimen to see if it is safe and effective for you. Taking Aspirin to Prevent Heart Attack and Stroke: Care Instructions Your Care Instructions Aspirin acts as a \"blood thinner. \" It prevents blood clots from forming. When taken during and after a heart attack, it can reduce your chance of dying. And it's used if you have a stent in your coronary artery. Also, aspirin helps certain people lower their risk of a heart attack or stroke. Be sure you know what dose of aspirin to take and how often to take it. Low-dose aspirin is typically 81 mg. But the dose for daily aspirin can range from 81 mg to 325 mg. Taking aspirin every day can cause bleeding. It may not be safe if you have stomach ulcers. And it may not be safe if you have high blood pressure that is not controlled. If you take aspirin pills every day, do not take ones that have other ingredients such as caffeine or sodium. Before you start to take aspirin, tell your doctor all the medicines, vitamins, herbal products, and supplements you take. Follow-up care is a key part of your treatment and safety.  Be sure to make and go to all appointments, and call your doctor if you are having problems. It's also a good idea to know your test results and keep a list of the medicines you take. How can you care for yourself at home? · Take aspirin with a full glass of water unless your doctor tells you not to. Do not lie down right after you take it. · If you have a stent in your coronary artery, take your aspirin as your heart doctor says to. If another doctor says to stop taking the aspirin for any reason, talk to your heart doctor before you stop. · Do not chew or crush the coated or sustained-release forms of aspirin. · Ask your doctor if you can drink alcohol while you take aspirin. And ask how much you can drink. Too much alcohol with aspirin can cause stomach bleeding. · Do not take aspirin if you are pregnant, unless your doctor says it is okay. · Keep all aspirin out of children's reach. · Throw aspirin away if it starts to smell like vinegar. · Do not take aspirin if you have gout or if you take prescription blood thinners, unless your doctor has told you to. · Do not take prescription or over-the-counter medicines, vitamins, herbal products, or supplements without talking to your doctor first. Taylor Karlo the label before you take another over-the-counter medicine. Many contain aspirin. So they could cause you to take too much aspirin. · Talk with your doctor before you take a pain medicine. Ask which type of medicine you can take and how to take it safely with aspirin. · Tell your doctor or dentist before a surgery or procedure that you take aspirin. He or she will tell you if you should stop taking aspirin before your surgery or procedure. Make sure that you understand exactly what your doctor wants you to do. Where can you learn more? Go to http://adamaris-liana.info/. Enter K289 in the search box to learn more about \"Taking Aspirin to Prevent Heart Attack and Stroke: Care Instructions. \" Current as of: January 27, 2016 Content Version: 11.2 © 0422-2918 [employer]wise, [employer]. Care instructions adapted under license by [employer] (which disclaims liability or warranty for this information). If you have questions about a medical condition or this instruction, always ask your healthcare professional. [address] any warranty or liability for your use of this information. Learning About Diabetes and Coronary Artery Disease How are diabetes and heart disease connected? Many people think diabetes and heart disease go hand in hand. But having diabetes doesn't have to mean that you are going to have a heart attack someday. Healthy living can help prevent many of the problems that come with both diabetes and heart disease. For some people, diabetes can cause problems in your body that may lead to heart disease. Diabetes can make the problems of heart disease worse. But here's the good news: The good things you're doing to stay healthy with diabeteseating healthy foods, quitting smoking, getting exercise and moreare also helping your heart. How can diabetes lead to heart disease? The same things that make diabetes a serious condition can also lead to heart disease or make it worse. · High cholesterol causes the buildup of a kind of fat inside the blood vessel walls, making them too narrow. This reduces the flow of blood and can cause a heart attack. · High blood pressure pushes blood through the arteries with too much force. Over time, this damages the walls of the arteries. · High blood sugar can damage the lining of blood vessels. This can lead to the hardening and narrowing of the arteries, resulting in less blood flow to the heart. Diabetes also increases your risk for kidney damage. If you have signs of kidney damage, you may also have a higher risk for heart disease.  Kidney damage shares many of the risk factors for heart disease (such as high cholesterol, high blood pressure, and high blood sugar). How can you keep your heart healthy when you have diabetes? Managing your diabetes and keeping your heart healthy are two sides of the same coin. Here are some things you can do. · Test your blood sugar levels and get your diabetes tests on schedule. Try to keep your numbers within your target range. · Keep track of your blood pressure. The target for most people with diabetes is below 140/90. Your doctor will give you a goal that's right for you. If your blood pressure is high, your treatment may also include medicine. Changes in your lifestyle, such as staying at a healthy weight, may also help you lower your blood pressure. · Eat heart-healthy foods. These include fruits, vegetables, whole grains, fish, and low-fat or nonfat dairy foods. Limit sodium, alcohol, and sweets. · If your doctor recommends it, get more exercise. Walking is a good choice. Bit by bit, increase the amount you walk every day. Try for at least 30 minutes on most days of the week. · Do not smoke. Smoking can make diabetes and heart disease worse. If you need help quitting, talk to your doctor about stop-smoking programs and medicines. These can increase your chances of quitting for good. · Your doctor may talk with you about taking medicines for your heart. For example, your doctor may suggest taking a statin or daily aspirin. Where can you learn more? Go to http://adamaris-liana.info/. Enter R545 in the search box to learn more about \"Learning About Diabetes and Coronary Artery Disease. \" Current as of: July 28, 2016 Content Version: 11.2 © 3040-9571 [employer]. Care instructions adapted under license by [employer] (which disclaims liability or warranty for this information).  If you have questions about a medical condition or this instruction, always ask your healthcare professional. [employer] disclaims any warranty or liability for your use of this information.

## 2017-04-04 PROBLEM — I20.0 UNSTABLE ANGINA (HCC): Status: ACTIVE | Noted: 2017-04-04

## 2017-04-04 PROBLEM — I25.10 CAD (CORONARY ARTERY DISEASE): Chronic | Status: ACTIVE | Noted: 2017-04-04

## 2017-04-04 LAB
ATRIAL RATE: 75 BPM
ATRIAL RATE: 89 BPM
CALCULATED P AXIS, ECG09: 27 DEGREES
CALCULATED P AXIS, ECG09: 37 DEGREES
CALCULATED R AXIS, ECG10: 1 DEGREES
CALCULATED R AXIS, ECG10: 10 DEGREES
CALCULATED T AXIS, ECG11: 19 DEGREES
CALCULATED T AXIS, ECG11: 25 DEGREES
DIAGNOSIS, 93000: NORMAL
DIAGNOSIS, 93000: NORMAL
EST. AVERAGE GLUCOSE BLD GHB EST-MCNC: 289 MG/DL
GLUCOSE BLD STRIP.AUTO-MCNC: 209 MG/DL (ref 65–100)
GLUCOSE BLD STRIP.AUTO-MCNC: 233 MG/DL (ref 65–100)
GLUCOSE BLD STRIP.AUTO-MCNC: 287 MG/DL (ref 65–100)
GLUCOSE BLD STRIP.AUTO-MCNC: 335 MG/DL (ref 65–100)
HBA1C MFR BLD: 11.7 % (ref 4.2–6.3)
P-R INTERVAL, ECG05: 124 MS
P-R INTERVAL, ECG05: 146 MS
Q-T INTERVAL, ECG07: 394 MS
Q-T INTERVAL, ECG07: 406 MS
QRS DURATION, ECG06: 78 MS
QRS DURATION, ECG06: 86 MS
QTC CALCULATION (BEZET), ECG08: 453 MS
QTC CALCULATION (BEZET), ECG08: 479 MS
SERVICE CMNT-IMP: ABNORMAL
TROPONIN I SERPL-MCNC: <0.04 NG/ML
TROPONIN I SERPL-MCNC: <0.04 NG/ML
VENTRICULAR RATE, ECG03: 75 BPM
VENTRICULAR RATE, ECG03: 89 BPM

## 2017-04-04 PROCEDURE — 74011250637 HC RX REV CODE- 250/637

## 2017-04-04 PROCEDURE — 96376 TX/PRO/DX INJ SAME DRUG ADON: CPT

## 2017-04-04 PROCEDURE — C1887 CATHETER, GUIDING: HCPCS

## 2017-04-04 PROCEDURE — 74011250636 HC RX REV CODE- 250/636

## 2017-04-04 PROCEDURE — 84484 ASSAY OF TROPONIN QUANT: CPT | Performed by: INTERNAL MEDICINE

## 2017-04-04 PROCEDURE — 99218 HC RM OBSERVATION: CPT

## 2017-04-04 PROCEDURE — 93005 ELECTROCARDIOGRAM TRACING: CPT

## 2017-04-04 PROCEDURE — 74011250637 HC RX REV CODE- 250/637: Performed by: INTERNAL MEDICINE

## 2017-04-04 PROCEDURE — 74011250636 HC RX REV CODE- 250/636: Performed by: INTERNAL MEDICINE

## 2017-04-04 PROCEDURE — C1894 INTRO/SHEATH, NON-LASER: HCPCS

## 2017-04-04 PROCEDURE — 82962 GLUCOSE BLOOD TEST: CPT

## 2017-04-04 PROCEDURE — 77030029065 HC DRSG HEMO QCLOT ZMED -B

## 2017-04-04 PROCEDURE — 74011636637 HC RX REV CODE- 636/637: Performed by: INTERNAL MEDICINE

## 2017-04-04 PROCEDURE — C1769 GUIDE WIRE: HCPCS

## 2017-04-04 PROCEDURE — 74011000250 HC RX REV CODE- 250: Performed by: INTERNAL MEDICINE

## 2017-04-04 PROCEDURE — 74011636320 HC RX REV CODE- 636/320: Performed by: INTERNAL MEDICINE

## 2017-04-04 PROCEDURE — 36415 COLL VENOUS BLD VENIPUNCTURE: CPT | Performed by: INTERNAL MEDICINE

## 2017-04-04 PROCEDURE — 77030004532 HC CATH ANGI DX IMP BSC -A

## 2017-04-04 PROCEDURE — 93458 L HRT ARTERY/VENTRICLE ANGIO: CPT

## 2017-04-04 PROCEDURE — 77030019569 HC BND COMPR RAD TERU -B

## 2017-04-04 PROCEDURE — 77030028837 HC SYR ANGI PWR INJ COEU -A

## 2017-04-04 PROCEDURE — 83036 HEMOGLOBIN GLYCOSYLATED A1C: CPT | Performed by: INTERNAL MEDICINE

## 2017-04-04 RX ORDER — HYDROCORTISONE SODIUM SUCCINATE 100 MG/2ML
100 INJECTION, POWDER, FOR SOLUTION INTRAMUSCULAR; INTRAVENOUS
Status: DISCONTINUED | OUTPATIENT
Start: 2017-04-04 | End: 2017-04-05 | Stop reason: HOSPADM

## 2017-04-04 RX ORDER — MORPHINE SULFATE 4 MG/ML
4 INJECTION, SOLUTION INTRAMUSCULAR; INTRAVENOUS
Status: COMPLETED | OUTPATIENT
Start: 2017-04-04 | End: 2017-04-04

## 2017-04-04 RX ORDER — INSULIN GLARGINE 100 [IU]/ML
36 INJECTION, SOLUTION SUBCUTANEOUS
Status: DISCONTINUED | OUTPATIENT
Start: 2017-04-05 | End: 2017-04-05 | Stop reason: HOSPADM

## 2017-04-04 RX ORDER — MAGNESIUM SULFATE 100 %
4 CRYSTALS MISCELLANEOUS AS NEEDED
Status: DISCONTINUED | OUTPATIENT
Start: 2017-04-04 | End: 2017-04-05 | Stop reason: HOSPADM

## 2017-04-04 RX ORDER — SODIUM CHLORIDE 0.9 % (FLUSH) 0.9 %
5-10 SYRINGE (ML) INJECTION AS NEEDED
Status: DISCONTINUED | OUTPATIENT
Start: 2017-04-04 | End: 2017-04-05 | Stop reason: HOSPADM

## 2017-04-04 RX ORDER — HEPARIN SODIUM 1000 [USP'U]/ML
3000 INJECTION, SOLUTION INTRAVENOUS; SUBCUTANEOUS ONCE
Status: COMPLETED | OUTPATIENT
Start: 2017-04-04 | End: 2017-04-04

## 2017-04-04 RX ORDER — INSULIN GLARGINE 100 [IU]/ML
20 INJECTION, SOLUTION SUBCUTANEOUS DAILY
Status: DISCONTINUED | OUTPATIENT
Start: 2017-04-04 | End: 2017-04-04

## 2017-04-04 RX ORDER — VERAPAMIL HYDROCHLORIDE 2.5 MG/ML
2.5 INJECTION, SOLUTION INTRAVENOUS
Status: DISCONTINUED | OUTPATIENT
Start: 2017-04-04 | End: 2017-04-04 | Stop reason: HOSPADM

## 2017-04-04 RX ORDER — GUAIFENESIN 100 MG/5ML
81 LIQUID (ML) ORAL DAILY
Status: DISCONTINUED | OUTPATIENT
Start: 2017-04-04 | End: 2017-04-05 | Stop reason: HOSPADM

## 2017-04-04 RX ORDER — ATORVASTATIN CALCIUM 40 MG/1
40 TABLET, FILM COATED ORAL DAILY
COMMUNITY

## 2017-04-04 RX ORDER — HYDRALAZINE HYDROCHLORIDE 20 MG/ML
20 INJECTION INTRAMUSCULAR; INTRAVENOUS
Status: DISCONTINUED | OUTPATIENT
Start: 2017-04-04 | End: 2017-04-05 | Stop reason: HOSPADM

## 2017-04-04 RX ORDER — AMITRIPTYLINE HYDROCHLORIDE 75 MG/1
75 TABLET, FILM COATED ORAL
COMMUNITY
End: 2017-09-24

## 2017-04-04 RX ORDER — MIDAZOLAM HYDROCHLORIDE 1 MG/ML
.5-1 INJECTION, SOLUTION INTRAMUSCULAR; INTRAVENOUS
Status: DISCONTINUED | OUTPATIENT
Start: 2017-04-04 | End: 2017-04-04 | Stop reason: HOSPADM

## 2017-04-04 RX ORDER — CLONAZEPAM 0.5 MG/1
0.5 TABLET ORAL 3 TIMES DAILY
COMMUNITY

## 2017-04-04 RX ORDER — ONDANSETRON 2 MG/ML
INJECTION INTRAMUSCULAR; INTRAVENOUS
Status: COMPLETED
Start: 2017-04-04 | End: 2017-04-04

## 2017-04-04 RX ORDER — ATORVASTATIN CALCIUM 20 MG/1
40 TABLET, FILM COATED ORAL DAILY
Status: DISCONTINUED | OUTPATIENT
Start: 2017-04-04 | End: 2017-04-05 | Stop reason: HOSPADM

## 2017-04-04 RX ORDER — HEPARIN SODIUM 200 [USP'U]/100ML
500 INJECTION, SOLUTION INTRAVENOUS ONCE
Status: COMPLETED | OUTPATIENT
Start: 2017-04-04 | End: 2017-04-04

## 2017-04-04 RX ORDER — LISINOPRIL 5 MG/1
5 TABLET ORAL DAILY
Status: DISCONTINUED | OUTPATIENT
Start: 2017-04-04 | End: 2017-04-05 | Stop reason: HOSPADM

## 2017-04-04 RX ORDER — SODIUM CHLORIDE 9 MG/ML
50 INJECTION, SOLUTION INTRAVENOUS CONTINUOUS
Status: DISPENSED | OUTPATIENT
Start: 2017-04-04 | End: 2017-04-05

## 2017-04-04 RX ORDER — DEXTROSE 50 % IN WATER (D50W) INTRAVENOUS SYRINGE
12.5-25 AS NEEDED
Status: DISCONTINUED | OUTPATIENT
Start: 2017-04-04 | End: 2017-04-05 | Stop reason: HOSPADM

## 2017-04-04 RX ORDER — TOPIRAMATE 100 MG/1
100 TABLET, FILM COATED ORAL 2 TIMES DAILY
COMMUNITY

## 2017-04-04 RX ORDER — VERAPAMIL HYDROCHLORIDE 120 MG/1
120 CAPSULE, EXTENDED RELEASE ORAL DAILY
Status: ON HOLD | COMMUNITY
End: 2017-04-04

## 2017-04-04 RX ORDER — HEPARIN SODIUM 200 [USP'U]/100ML
500 INJECTION, SOLUTION INTRAVENOUS
Status: DISCONTINUED | OUTPATIENT
Start: 2017-04-04 | End: 2017-04-04 | Stop reason: HOSPADM

## 2017-04-04 RX ORDER — MORPHINE SULFATE 4 MG/ML
INJECTION, SOLUTION INTRAMUSCULAR; INTRAVENOUS
Status: COMPLETED
Start: 2017-04-04 | End: 2017-04-04

## 2017-04-04 RX ORDER — ONDANSETRON 2 MG/ML
4 INJECTION INTRAMUSCULAR; INTRAVENOUS
Status: DISCONTINUED | OUTPATIENT
Start: 2017-04-04 | End: 2017-04-05 | Stop reason: HOSPADM

## 2017-04-04 RX ORDER — SODIUM CHLORIDE 0.9 % (FLUSH) 0.9 %
5-10 SYRINGE (ML) INJECTION EVERY 8 HOURS
Status: DISCONTINUED | OUTPATIENT
Start: 2017-04-04 | End: 2017-04-05 | Stop reason: HOSPADM

## 2017-04-04 RX ORDER — CLONAZEPAM 0.5 MG/1
0.5 TABLET ORAL 3 TIMES DAILY
Status: DISCONTINUED | OUTPATIENT
Start: 2017-04-04 | End: 2017-04-05 | Stop reason: HOSPADM

## 2017-04-04 RX ORDER — LIDOCAINE HYDROCHLORIDE 10 MG/ML
10-30 INJECTION INFILTRATION; PERINEURAL
Status: DISCONTINUED | OUTPATIENT
Start: 2017-04-04 | End: 2017-04-04 | Stop reason: HOSPADM

## 2017-04-04 RX ORDER — ONDANSETRON 2 MG/ML
8 INJECTION INTRAMUSCULAR; INTRAVENOUS
Status: COMPLETED | OUTPATIENT
Start: 2017-04-04 | End: 2017-04-04

## 2017-04-04 RX ORDER — MORPHINE SULFATE 2 MG/ML
2 INJECTION, SOLUTION INTRAMUSCULAR; INTRAVENOUS
Status: DISCONTINUED | OUTPATIENT
Start: 2017-04-04 | End: 2017-04-05 | Stop reason: HOSPADM

## 2017-04-04 RX ORDER — ZOLPIDEM TARTRATE 10 MG/1
10 TABLET ORAL
COMMUNITY

## 2017-04-04 RX ORDER — AMITRIPTYLINE HYDROCHLORIDE 25 MG/1
75 TABLET, FILM COATED ORAL
Status: DISCONTINUED | OUTPATIENT
Start: 2017-04-04 | End: 2017-04-05 | Stop reason: HOSPADM

## 2017-04-04 RX ORDER — FENTANYL CITRATE 50 UG/ML
25-200 INJECTION, SOLUTION INTRAMUSCULAR; INTRAVENOUS
Status: DISCONTINUED | OUTPATIENT
Start: 2017-04-04 | End: 2017-04-04 | Stop reason: HOSPADM

## 2017-04-04 RX ORDER — ENOXAPARIN SODIUM 100 MG/ML
40 INJECTION SUBCUTANEOUS EVERY 24 HOURS
Status: DISCONTINUED | OUTPATIENT
Start: 2017-04-04 | End: 2017-04-05 | Stop reason: HOSPADM

## 2017-04-04 RX ORDER — INSULIN GLARGINE 100 [IU]/ML
36 INJECTION, SOLUTION SUBCUTANEOUS DAILY
Status: ON HOLD | COMMUNITY
End: 2017-04-05

## 2017-04-04 RX ORDER — TOPIRAMATE 100 MG/1
100 TABLET, FILM COATED ORAL 2 TIMES DAILY
Status: DISCONTINUED | OUTPATIENT
Start: 2017-04-04 | End: 2017-04-05 | Stop reason: HOSPADM

## 2017-04-04 RX ORDER — INSULIN LISPRO 100 [IU]/ML
INJECTION, SOLUTION INTRAVENOUS; SUBCUTANEOUS
Status: DISCONTINUED | OUTPATIENT
Start: 2017-04-04 | End: 2017-04-05 | Stop reason: HOSPADM

## 2017-04-04 RX ORDER — ACETAMINOPHEN 325 MG/1
650 TABLET ORAL
Status: DISCONTINUED | OUTPATIENT
Start: 2017-04-04 | End: 2017-04-05 | Stop reason: HOSPADM

## 2017-04-04 RX ORDER — INSULIN LISPRO 100 [IU]/ML
INJECTION, SOLUTION INTRAVENOUS; SUBCUTANEOUS
COMMUNITY
End: 2017-04-05

## 2017-04-04 RX ORDER — ZOLPIDEM TARTRATE 5 MG/1
10 TABLET ORAL
Status: DISCONTINUED | OUTPATIENT
Start: 2017-04-04 | End: 2017-04-05 | Stop reason: HOSPADM

## 2017-04-04 RX ADMIN — Medication 10 ML: at 13:49

## 2017-04-04 RX ADMIN — HYDRALAZINE HYDROCHLORIDE 20 MG: 20 INJECTION INTRAMUSCULAR; INTRAVENOUS at 17:22

## 2017-04-04 RX ADMIN — FENTANYL CITRATE 25 MCG: 50 INJECTION, SOLUTION INTRAMUSCULAR; INTRAVENOUS at 16:44

## 2017-04-04 RX ADMIN — Medication 4 MG: at 01:30

## 2017-04-04 RX ADMIN — INSULIN LISPRO 4 UNITS: 100 INJECTION, SOLUTION INTRAVENOUS; SUBCUTANEOUS at 21:32

## 2017-04-04 RX ADMIN — NITROGLYCERIN 1 INCH: 20 OINTMENT TOPICAL at 08:45

## 2017-04-04 RX ADMIN — INSULIN LISPRO 5 UNITS: 100 INJECTION, SOLUTION INTRAVENOUS; SUBCUTANEOUS at 19:38

## 2017-04-04 RX ADMIN — HEPARIN SODIUM 1000 UNITS: 200 INJECTION, SOLUTION INTRAVENOUS at 16:27

## 2017-04-04 RX ADMIN — ONDANSETRON 8 MG: 2 INJECTION INTRAMUSCULAR; INTRAVENOUS at 01:27

## 2017-04-04 RX ADMIN — Medication 2 MG: at 09:44

## 2017-04-04 RX ADMIN — FENTANYL CITRATE 50 MCG: 50 INJECTION, SOLUTION INTRAMUSCULAR; INTRAVENOUS at 16:02

## 2017-04-04 RX ADMIN — MIDAZOLAM HYDROCHLORIDE 1 MG: 1 INJECTION, SOLUTION INTRAMUSCULAR; INTRAVENOUS at 16:18

## 2017-04-04 RX ADMIN — ACETAMINOPHEN 650 MG: 325 TABLET ORAL at 04:36

## 2017-04-04 RX ADMIN — CLONAZEPAM 0.5 MG: 0.5 TABLET ORAL at 19:38

## 2017-04-04 RX ADMIN — Medication 10 ML: at 18:00

## 2017-04-04 RX ADMIN — NITROGLYCERIN 1 INCH: 20 OINTMENT TOPICAL at 04:35

## 2017-04-04 RX ADMIN — Medication 10 ML: at 06:28

## 2017-04-04 RX ADMIN — Medication 2 MG: at 13:48

## 2017-04-04 RX ADMIN — LIDOCAINE HYDROCHLORIDE 10 ML: 10 INJECTION, SOLUTION INFILTRATION; PERINEURAL at 16:23

## 2017-04-04 RX ADMIN — FENTANYL CITRATE 25 MCG: 50 INJECTION, SOLUTION INTRAMUSCULAR; INTRAVENOUS at 16:30

## 2017-04-04 RX ADMIN — INSULIN GLARGINE 20 UNITS: 100 INJECTION, SOLUTION SUBCUTANEOUS at 09:41

## 2017-04-04 RX ADMIN — AMITRIPTYLINE HYDROCHLORIDE 75 MG: 25 TABLET, FILM COATED ORAL at 21:34

## 2017-04-04 RX ADMIN — HEPARIN SODIUM 3000 UNITS: 1000 INJECTION, SOLUTION INTRAVENOUS; SUBCUTANEOUS at 16:30

## 2017-04-04 RX ADMIN — TOPIRAMATE 100 MG: 100 TABLET, FILM COATED ORAL at 19:38

## 2017-04-04 RX ADMIN — FENTANYL CITRATE 25 MCG: 50 INJECTION, SOLUTION INTRAMUSCULAR; INTRAVENOUS at 16:19

## 2017-04-04 RX ADMIN — MIDAZOLAM HYDROCHLORIDE 1 MG: 1 INJECTION, SOLUTION INTRAMUSCULAR; INTRAVENOUS at 16:10

## 2017-04-04 RX ADMIN — ASPIRIN 81 MG CHEWABLE TABLET 81 MG: 81 TABLET CHEWABLE at 09:00

## 2017-04-04 RX ADMIN — ONDANSETRON 4 MG: 2 INJECTION INTRAMUSCULAR; INTRAVENOUS at 19:39

## 2017-04-04 RX ADMIN — ACETAMINOPHEN 650 MG: 325 TABLET ORAL at 21:38

## 2017-04-04 RX ADMIN — ATORVASTATIN CALCIUM 40 MG: 20 TABLET, FILM COATED ORAL at 21:48

## 2017-04-04 RX ADMIN — Medication 10 ML: at 21:35

## 2017-04-04 RX ADMIN — MORPHINE SULFATE 4 MG: 4 INJECTION, SOLUTION INTRAMUSCULAR; INTRAVENOUS at 01:30

## 2017-04-04 RX ADMIN — IOPAMIDOL 60 ML: 755 INJECTION, SOLUTION INTRAVENOUS at 16:52

## 2017-04-04 RX ADMIN — SODIUM CHLORIDE 50 ML/HR: 900 INJECTION, SOLUTION INTRAVENOUS at 19:39

## 2017-04-04 RX ADMIN — VERAPAMIL HYDROCHLORIDE 2.5 MG: 2.5 INJECTION INTRAVENOUS at 16:27

## 2017-04-04 RX ADMIN — ENOXAPARIN SODIUM 40 MG: 100 INJECTION SUBCUTANEOUS at 02:36

## 2017-04-04 RX ADMIN — MIDAZOLAM HYDROCHLORIDE 1 MG: 1 INJECTION, SOLUTION INTRAMUSCULAR; INTRAVENOUS at 16:22

## 2017-04-04 RX ADMIN — NITROGLYCERIN 200 MCG: 5 INJECTION, SOLUTION INTRAVENOUS at 16:26

## 2017-04-04 RX ADMIN — LISINOPRIL 5 MG: 5 TABLET ORAL at 19:38

## 2017-04-04 RX ADMIN — Medication 2 MG: at 19:39

## 2017-04-04 NOTE — PROGRESS NOTES
5:37 PM  Process of removing TR band    1800  TR band off  Wrist site stable    6:37 PM  TRANSFER - OUT REPORT:    Verbal report given to Marcella(name) on [facility]  being transferred to [facility](unit) for routine post - op       Report consisted of patients Situation, Background, Assessment and   Recommendations(SBAR). Information from the following report(s) SBAR, Procedure Summary, MAR and Recent Results was reviewed with the receiving nurse. Lines:   Peripheral IV 04/03/17 Left Antecubital (Active)   Site Assessment Clean, dry, & intact 4/4/2017  5:11 PM   Phlebitis Assessment 0 4/4/2017  5:11 PM   Infiltration Assessment 0 4/4/2017  5:11 PM   Dressing Status Clean, dry, & intact 4/4/2017  5:11 PM   Dressing Type Tape;Transparent 4/4/2017  5:11 PM   Hub Color/Line Status Pink 4/4/2017  5:11 PM   Action Taken Open ports on tubing capped 4/4/2017  5:11 PM   Alcohol Cap Used Yes 4/4/2017  5:11 PM        Opportunity for questions and clarification was provided.       Patient transported with:   Registered Nurse          \

## 2017-04-04 NOTE — PROGRESS NOTES
4-4-2017 CASE MANAGEMENT NOTE:  I met with the pt to determine potential discharge needs. The pt lives with her  and 5 children in mobile home. She is independent with her ADL's, has no DME and drives. She uses [facility] as her PCP. She has prescription drug coverage and gets her medications from  [facility]. She signed the Observation letter, was given a copy and the original was placed on her chart. She does not anticipate any discharge needs. Care Management Interventions  PCP Verified by CM:  Yes [facility])  Discharge Durable Medical Equipment: No  Physical Therapy Consult: No  Occupational Therapy Consult: No  Speech Therapy Consult: No  Current Support Network: Lives with Spouse, Own Home  Confirm Follow Up Transport: Family  Discharge Location  Discharge Placement:  (Home with family)    Alexa Hong CM

## 2017-04-04 NOTE — PROGRESS NOTES
Nml cath  ECHO - to evaluate EF  Add Lisinopril for BP control      Singh Foley MD, [facility] [place]

## 2017-04-04 NOTE — ED NOTES
Bedside and Verbal shift change report given to Valentina Quiroga RN (oncoming nurse) by Spring Nguyễn RN (offgoing nurse). Report included the following information SBAR, Kardex, ED Summary, Procedure Summary, Intake/Output, MAR, Recent Results, Med Rec Status and Cardiac Rhythm NSR.

## 2017-04-04 NOTE — ROUTINE PROCESS
TRANSFER - OUT REPORT:    Verbal report given to Honey(name) on Doroteo Rico  being transferred to [facility] for routine progression of care       Report consisted of patients Situation, Background, Assessment and   Recommendations(SBAR). Information from the following report(s) SBAR was reviewed with the receiving nurse. Lines:   Peripheral IV 04/03/17 Left Antecubital (Active)   Site Assessment Clean, dry, & intact 4/4/2017  9:59 AM   Phlebitis Assessment 0 4/4/2017  9:59 AM   Infiltration Assessment 0 4/4/2017  9:59 AM   Dressing Status Clean, dry, & intact 4/4/2017  9:59 AM   Dressing Type Transparent 4/4/2017  9:59 AM   Hub Color/Line Status Flushed;Capped 4/4/2017  9:59 AM   Action Taken Blood drawn 4/3/2017 10:55 PM   Alcohol Cap Used Yes 4/4/2017  9:59 AM        Opportunity for questions and clarification was provided.       Patient transported with:   Registered Nurse

## 2017-04-04 NOTE — CONSULTS
Rosalba Elizabeth MD [facility] [address]  Office [phone]      Date of  Admission: 4/3/2017 10:27 PM       Assessment/Plan:   · Chest pain: concern for UPA: exertional type symptoms associated with nausea, SOB, radiation to L arm. Worse with exertion, better with rest and NTP. Cath in 2014 shows 40 % RCA lesion. She has multiple risk factors for CAD: uncontrolled HTN, DM, obesity and strong family hx. (mother, father with MI, CVA, sister CVA) > Will discuss with Dr Lin Hodges re: further plan for diasnostics. · DM: A1C > 11. DCT to see. Cont statin, ASA, ck lipid panel. · HTN:       Agreed. Pt seen and examined. Exertional CP worrisome for [place] in Pt with poorly controlled DM. CTA.  RRR. Cath/poss today @ ~3pm by Dr. Brennan Lucio. The risks (including but not limited to death, myocardial infarction, cerebrovascular accident, dysrhythmia, renal failure, vascular complication, allergy, and/or need for emergency surgery), benefits, and alternatives have been explained. Verbal informed consent has been obtained. Further mgmt based on cath. Thank you for allowing us to participate in Arvind Manzanares care. We will be happy to follow along. Please call for any questions. Consult requested by Magno Vickers MD for chest pain    Subjective:  Arvind Manzanares is a 46 y.o.   female  with PMH significant for HTN, DM , CAD who presented to the ED with chief complaint of exertional chest pain. Started 2 days ago while at rest. Trops negative. She lost her health insurance a yr ago and has been off her meds for some time. The patient denies chest pain, dependent edema, diaphoresis, TYLER, orthopnea, palpitations, PND, shortness of breath, claudication or syncope. No bleeding.      Cardiac risk factors    HTN   DM    Family hx        Patient has been treated with NTP with intermittent relief    LABS:   Troponin: nml       ProBNP: D-Dimer:      CXR:      CTA:   Cardiographics:   Telemetry:   ECG:     Cardiac Testing/ Procedures: A. Cardiac Cath/PCI: 2014 LCx 30-40% lesion  B.ECHO/ESTELITA:   C.StressNuclear/Stress ECHO/Stress test: 2015 negative for ischemia  D. Vascular:   E. EP:   F. Miscellaneous    SOCIAL HX: , non smoker  FAMILY HX:      Patient Active Problem List    Diagnosis Date Noted    Uncontrolled diabetes mellitus ([address] 07/24/2014    Incisional pain 05/20/2014    Chronic abdominal pain 01/21/2014    Ventral hernia 01/21/2014    Abdominal pain, other specified site 12/29/2013    DM (diabetes mellitus) ([address] 01/02/2013    HTN (hypertension) 01/02/2013    Neuropathy 01/02/2013    Fibromyalgia 01/02/2013    SEUN on CPAP 01/02/2013    Morbid obesity ([address] 01/02/2013      Past Medical History:   Diagnosis Date    Anxiety     Arthritis     Autoimmune disease ([address]     Bronchitis     CAD (coronary artery disease)     Chronic pain     fibromyalgia    Diabetes (HCC)     Fibromyalgia     GERD (gastroesophageal reflux disease)     H/O pericarditis     High cholesterol     History of continuous positive airway pressure (CPAP) therapy     Hypertension     Irregular heart beat     Morbid obesity ([address]     Other ill-defined conditions     sleep apnea    Thromboembolus ([address]     2010 Leg DVT    Unspecified sleep apnea     cpap      Past Surgical History:   Procedure Laterality Date    HX HEENT  2009    tonsillectomy    HX ORTHOPAEDIC  2010    right knee patellar surgery with hardware    HX IRVING AND BSO  2005    hysterectomy     Allergies   Allergen Reactions    Aspirin Other (comments)     Rectal bleed    Nsaids (Non-Steroidal Anti-Inflammatory Drug) Shortness of Breath      Current Facility-Administered Medications   Medication Dose Route Frequency    sodium chloride (NS) flush 5-10 mL  5-10 mL IntraVENous Q8H    sodium chloride (NS) flush 5-10 mL  5-10 mL IntraVENous PRN    acetaminophen (TYLENOL) tablet 650 mg  650 mg Oral Q4H PRN    morphine injection 2 mg  2 mg IntraVENous Q4H PRN    ondansetron (ZOFRAN) injection 4 mg  4 mg IntraVENous Q4H PRN    enoxaparin (LOVENOX) injection 40 mg  40 mg SubCUTAneous Q24H    aspirin chewable tablet 81 mg  81 mg Oral DAILY    nitroglycerin (NITROBID) 2 % ointment 1 Inch  1 Inch Topical BID    glucose chewable tablet 16 g  4 Tab Oral PRN    dextrose (D50W) injection syrg 12.5-25 g  12.5-25 g IntraVENous PRN    glucagon (GLUCAGEN) injection 1 mg  1 mg IntraMUSCular PRN    insulin lispro (HUMALOG) injection   SubCUTAneous AC&HS    insulin glargine (LANTUS) injection 20 Units  20 Units SubCUTAneous DAILY          Review of Symptoms:  Constitutional: positive for fatigue  ENT: negative   Respiratory: positive for dyspnea on exertion  Gastrointestinal: negative for reflux symptoms  Genitourinary: No dysuria, hematuria, frequency   Musculoskeletal:negative for muscle weakness  Neurological: negative for memory problems  Other systems reviewed and negative except as above.   Social History     Social History    Marital status:      Spouse name: N/A    Number of children: N/A    Years of education: N/A     Social History Main Topics    Smoking status: Never Smoker    Smokeless tobacco: Never Used    Alcohol use No    Drug use: No    Sexual activity: Not Currently     Other Topics Concern    None     Social History Narrative     Family History   Problem Relation Age of Onset    Diabetes Mother     Hypertension Mother     Heart Disease Mother    Haskel Mooring Diabetes Father     Hypertension Father     Heart Disease Father     Diabetes Sister     Diabetes Sister     Hypertension Sister          Physical Exam    Visit Vitals    BP (!) 144/99 (BP 1 Location: Left arm, BP Patient Position: At rest)    Pulse 74    Temp 98.8 °F (37.1 °C)    Resp 16    Ht 5' 7\" (1.702 m)    Wt 214 lb (97.1 kg)    SpO2 97%    Breastfeeding No    BMI 33.52 kg/m2     General: awake, alert, and oriented. MAEx4. No acute distress   HEENT Exam:     Normocephalic, atraumatic. Sclera anicteric . Neck: supple, no lymphadenopathy  Lung Exam:     Not dyspneic at rest. . Respirations unlabored. O2 @  97% room air  Lungs: No wheezes, crackles, rhonchi, or rubs heard on auscultation. Heart Exam:     Unable to palpate PMI due to body habitus Rate: Rhythm: regular,   No  S3, S4 gallop, murmur, click, or rub. No JVD, brisk carotid upstroke, no carotid bruits. Abdomen Exam:      obese, soft, nontender. + Bowel sounds. No palpable masses; organomegaly. No bruits or pulsatile mass. : voiding     Extremities Exam:      Atraumatic. No clubbing, cyanosis, edema, ulcers, varicose veins, rash, swelling, erythema.     Vascular Exam:      radial, brachial, dorsalis pedis, posterior tibial, are equal and strong bilaterally     Neuro exam:  No focal deficits   Psy Exam: Normal affect, does not appear anxious or agitated        Recent Results (from the past 12 hour(s))   EKG, 12 LEAD, INITIAL    Collection Time: 04/03/17 10:41 PM   Result Value Ref Range    Ventricular Rate 89 BPM    Atrial Rate 89 BPM    P-R Interval 124 ms    QRS Duration 86 ms    Q-T Interval 394 ms    QTC Calculation (Bezet) 479 ms    Calculated P Axis 27 degrees    Calculated R Axis 1 degrees    Calculated T Axis 25 degrees    Diagnosis       Normal sinus rhythm  Minimal voltage criteria for LVH, may be normal variant  Borderline ECG  When compared with ECG of 24-MAR-2017 11:27,  No significant change was found     CBC WITH AUTOMATED DIFF    Collection Time: 04/03/17 10:53 PM   Result Value Ref Range    WBC 5.4 3.6 - 11.0 K/uL    RBC 4.20 3.80 - 5.20 M/uL    HGB 12.1 11.5 - 16.0 g/dL    HCT 36.1 35.0 - 47.0 %    MCV 86.0 80.0 - 99.0 FL    MCH 28.8 26.0 - 34.0 PG    MCHC 33.5 30.0 - 36.5 g/dL    RDW 13.3 11.5 - 14.5 %    PLATELET 721 251 - 292 K/uL    NEUTROPHILS 55 32 - 75 %    LYMPHOCYTES 39 12 - 49 %    MONOCYTES 5 5 - 13 % EOSINOPHILS 1 0 - 7 %    BASOPHILS 0 0 - 1 %    ABS. NEUTROPHILS 2.9 1.8 - 8.0 K/UL    ABS. LYMPHOCYTES 2.1 0.8 - 3.5 K/UL    ABS. MONOCYTES 0.3 0.0 - 1.0 K/UL    ABS. EOSINOPHILS 0.1 0.0 - 0.4 K/UL    ABS. BASOPHILS 0.0 0.0 - 0.1 K/UL   METABOLIC PANEL, COMPREHENSIVE    Collection Time: 04/03/17 10:53 PM   Result Value Ref Range    Sodium 140 136 - 145 mmol/L    Potassium 3.6 3.5 - 5.1 mmol/L    Chloride 106 97 - 108 mmol/L    CO2 22 21 - 32 mmol/L    Anion gap 12 5 - 15 mmol/L    Glucose 228 (H) 65 - 100 mg/dL    BUN 8 6 - 20 MG/DL    Creatinine 0.94 0.55 - 1.02 MG/DL    BUN/Creatinine ratio 9 (L) 12 - 20      GFR est AA >60 >60 ml/min/1.73m2    GFR est non-AA >60 >60 ml/min/1.73m2    Calcium 8.4 (L) 8.5 - 10.1 MG/DL    Bilirubin, total 0.1 (L) 0.2 - 1.0 MG/DL    ALT (SGPT) 23 12 - 78 U/L    AST (SGOT) 12 (L) 15 - 37 U/L    Alk.  phosphatase 103 45 - 117 U/L    Protein, total 7.0 6.4 - 8.2 g/dL    Albumin 3.4 (L) 3.5 - 5.0 g/dL    Globulin 3.6 2.0 - 4.0 g/dL    A-G Ratio 0.9 (L) 1.1 - 2.2     TROPONIN I    Collection Time: 04/03/17 10:53 PM   Result Value Ref Range    Troponin-I, Qt. <0.04 <0.05 ng/mL   D DIMER    Collection Time: 04/03/17 10:53 PM   Result Value Ref Range    D-dimer 0.30 0.00 - 0.65 mg/L FEU   HEMOGLOBIN A1C WITH EAG    Collection Time: 04/04/17  2:35 AM   Result Value Ref Range    Hemoglobin A1c 11.7 (H) 4.2 - 6.3 %    Est. average glucose 289 mg/dL   TROPONIN I    Collection Time: 04/04/17  2:35 AM   Result Value Ref Range    Troponin-I, Qt. <0.04 <0.05 ng/mL   GLUCOSE, POC    Collection Time: 04/04/17  8:44 AM   Result Value Ref Range    Glucose (POC) 233 (H) 65 - 100 mg/dL    Performed by Joseph Rollins (PCT)        Ayo Darling [facility]

## 2017-04-04 NOTE — ROUTINE PROCESS
TRANSFER - IN REPORT:    Verbal report received from Bedside RN(name) on Jeneal Dopp  being received from 326(unit) for ordered procedure      Report consisted of patients Situation, Background, Assessment and   Recommendations(SBAR). Information from the following report(s) SBAR was reviewed with the receiving nurse. Opportunity for questions and clarification was provided. Assessment completed upon patients arrival to unit and care assumed.

## 2017-04-04 NOTE — ROUTINE PROCESS
TRANSFER - OUT REPORT:    Verbal report given to Rosette Cormier RN (name) on Lena Zuleta  being transferred to [facility] (unit) for routine progression of care       Report consisted of patients Situation, Background, Assessment and   Recommendations(SBAR). Information from the following report(s) SBAR, ED Summary, [facility] and Recent Results was reviewed with the receiving nurse. Lines:   Peripheral IV 04/03/17 Left Antecubital (Active)   Site Assessment Clean, dry, & intact 4/3/2017 10:55 PM   Phlebitis Assessment 0 4/3/2017 10:55 PM   Infiltration Assessment 0 4/3/2017 10:55 PM   Dressing Status Clean, dry, & intact 4/3/2017 10:55 PM   Hub Color/Line Status Positional 4/3/2017 10:55 PM   Action Taken Blood drawn 4/3/2017 10:55 PM        Opportunity for questions and clarification was provided.       Patient transported with:   Monitor  Tech

## 2017-04-04 NOTE — PROGRESS NOTES
TRANSFER - IN REPORT:    Verbal report received from [address] on [facility]  being received from Cath Lab for routine progression of care      Report consisted of patients Situation, Background, Assessment and   Recommendations(SBAR). Information from the following report(s) Procedure Summary and MAR was reviewed with the receiving nurse. Opportunity for questions and clarification was provided. Assessment completed upon patients arrival to unit and care assumed. TR Band in place; cath site free from bleeding. 1725 Bedside, Verbal and Written shift change report given to [address] (oncoming nurse) by Reyes Montane (offgoing nurse). Report included the following information Procedure Summary and MAR.

## 2017-04-04 NOTE — PROCEDURES
BRIEF PROCEDURE NOTE    Date of Procedure: 4/4/2017   Preoperative Diagnosis: ACS - UA  Postoperative Diagnosis: No Sig CAD  Procedure: Left heart cath, LV angiography, coronary angiography  Interventional Cardiologist: Rocio Mcwilliams MD  Anesthesia: local + IV sedation  Estimated Blood Loss: Minimal  Findings:   L Main: Nml    LAD: Med; MLI ; D1/D2 - Nml    LCflex: Med; Nml; OM1 - Nml    RCA:  Dominant/ MLI    LVEDP: 18    No significant gradient across aortic valve. Specimens Removed : None    Closure Device: TR Band        See full cath note.     Complications: none    Rocio Mcwilliams MD

## 2017-04-04 NOTE — PROGRESS NOTES
Royce Jacobesn [facility] [address]  [address] [facility], [address] [place] [address]  [phone]      Medical Progress Note      NAME: Renetta Fermin   :  1975  MRM:  [id number]    Date/Time: 2017         Subjective:     Chief Complaint:  Patient was seen and examined by me. Chart reviewed. Still c/o chest pain, L sided       Objective:       Vitals:       Last 24hrs VS reviewed since prior progress note.  Most recent are:    Visit Vitals    BP (!) 134/99 (BP 1 Location: Left arm, BP Patient Position: At rest)    Pulse 68    Temp 98.6 °F (37 °C)    Resp 16    Ht 5' 7\" (1.702 m)    Wt 97.1 kg (214 lb)    SpO2 98%    Breastfeeding No    BMI 33.52 kg/m2     SpO2 Readings from Last 6 Encounters:   17 98%   17 97%   17 99%   16 95%   11/08/15 96%   10/12/15 96%        No intake or output data in the 24 hours ending 17 1250     Exam:     Physical Exam:    Gen:  Well-developed, well-nourished, morbidly obese, mild distress  HEENT:  Pink conjunctivae, PERRL, hearing intact to voice, moist mucous membranes  Neck:  Supple, without masses, thyroid non-tender  Resp:  No accessory muscle use, clear breath sounds without wheezes rales or rhonchi  Card:  No murmurs, normal S1, S2 without thrills, bruits or peripheral edema  Abd:  Soft, non-tender, non-distended, normoactive bowel sounds are present  Musc:  No cyanosis or clubbing  Skin:  No rashes   Neuro:  Cranial nerves 3-12 are grossly intact, follows commands appropriately  Psych:  Good insight, oriented to person, place and time, alert    Medications Reviewed: (see below)    Lab Data Reviewed: (see below)    ______________________________________________________________________    Medications:     Current Facility-Administered Medications   Medication Dose Route Frequency    sodium chloride (NS) flush 5-10 mL  5-10 mL IntraVENous Q8H    sodium chloride (NS) flush 5-10 mL  5-10 mL IntraVENous PRN    acetaminophen (TYLENOL) tablet 650 mg  650 mg Oral Q4H PRN    morphine injection 2 mg  2 mg IntraVENous Q4H PRN    ondansetron (ZOFRAN) injection 4 mg  4 mg IntraVENous Q4H PRN    enoxaparin (LOVENOX) injection 40 mg  40 mg SubCUTAneous Q24H    aspirin chewable tablet 81 mg  81 mg Oral DAILY    nitroglycerin (NITROBID) 2 % ointment 1 Inch  1 Inch Topical BID    glucose chewable tablet 16 g  4 Tab Oral PRN    dextrose (D50W) injection syrg 12.5-25 g  12.5-25 g IntraVENous PRN    glucagon (GLUCAGEN) injection 1 mg  1 mg IntraMUSCular PRN    insulin lispro (HUMALOG) injection   SubCUTAneous AC&HS    amitriptyline (ELAVIL) tablet 75 mg  75 mg Oral QHS    [START ON 4/5/2017] atorvastatin (LIPITOR) tablet 40 mg  40 mg Oral DAILY    clonazePAM (KlonoPIN) tablet 0.5 mg  0.5 mg Oral TID    [START ON 4/5/2017] insulin glargine (LANTUS) injection 36 Units  36 Units SubCUTAneous ACB    topiramate (TOPAMAX) tablet 100 mg  100 mg Oral BID    zolpidem (AMBIEN) tablet 10 mg  10 mg Oral QHS PRN          Lab Review:     Recent Labs      04/03/17   2253   WBC  5.4   HGB  12.1   HCT  36.1   PLT  217     Recent Labs      04/03/17   2253   NA  140   K  3.6   CL  106   CO2  22   GLU  228*   BUN  8   CREA  0.94   CA  8.4*   ALB  3.4*   TBILI  0.1*   SGOT  12*   ALT  23     Lab Results   Component Value Date/Time    Glucose (POC) 209 04/04/2017 11:00 AM    Glucose (POC) 233 04/04/2017 08:44 AM    Glucose (POC) 329 03/24/2017 02:18 PM    Glucose (POC) 199 10/12/2015 12:11 AM    Glucose (POC) 194 02/17/2014 10:52 AM    Glucose (POC) 209 01/01/2014 11:24 AM    Glucose  07/24/2014 09:00 AM          Assessment / Plan:     45 yo hx of HTN, DM, CAD, morbid obesity, DVT, presented w/ chest pain    1) Chest pain: concerning for unstable angina. Has known hx of CAD (40% RCA occlusion on prior cath). EKG and cardiac enzymes neg. Cont O2, nitro prn, ASA, statin. Cards following, plan for cath today    2) HTN: not on meds.   Would benefit from a BB    3) Hyperlipidemia: lipids pending, cont statin    4) DM type 2 w/o complications: S1H 47.3%. Will resume home Lantus, SSI.   Consult DM educator    5) Morbid obesity: recommended wt loss    6) Fibromyalgia: cont topamax, elavil    Total time spent with patient: 30 [address] discussed with: Patient, family, nursing    Discussed:  Care Plan    Prophylaxis:  Lovenox    Disposition:  Home w/Family           ___________________________________________________    Attending Physician: Breezy Badillo MD

## 2017-04-04 NOTE — H&P
[facility]  [address] [facility], [place] [address]  [phone]    Admission History and Physical      NAME:  Malinda Saldana   :   1975   MRN:  [id number]     PCP:  NANCY Cadena     Date/Time:  2017         Subjective:     CHIEF COMPLAINT: chest pain     HISTORY OF PRESENT ILLNESS:     Ms. Maria Fernanda Coffey is a 46 y.o. with h/o htn, dm, xol who presents with chest pain. Pain is located on the left side of her chest and is worse with palpation. She follows with a cardiologist at  [address] who performed cath 3 years ago with 40% lesion; records are scanned into chart. Currently she is cp free.        Past Medical History:   Diagnosis Date    Anxiety     Arthritis     Autoimmune disease ([address]     Bronchitis     CAD (coronary artery disease)     Chronic pain     fibromyalgia    Diabetes ([address]     Fibromyalgia     GERD (gastroesophageal reflux disease)     H/O pericarditis     High cholesterol     History of continuous positive airway pressure (CPAP) therapy     Hypertension     Irregular heart beat     Morbid obesity ([address]     Other ill-defined conditions     sleep apnea    Thromboembolus ([address]     2010 Leg DVT    Unspecified sleep apnea     cpap        Past Surgical History:   Procedure Laterality Date    HX HEENT  2009    tonsillectomy    HX ORTHOPAEDIC  2010    right knee patellar surgery with hardware    HX IRVING AND BSO  2005    hysterectomy       Social History   Substance Use Topics    Smoking status: Never Smoker    Smokeless tobacco: Never Used    Alcohol use No        Family History   Problem Relation Age of Onset    Diabetes Mother     Hypertension Mother     Heart Disease Mother     Diabetes Father     Hypertension Father     Heart Disease Father     Diabetes Sister     Diabetes Sister     Hypertension Sister         Allergies   Allergen Reactions    Aspirin Other (comments)     Rectal bleed    Nsaids (Non-Steroidal Anti-Inflammatory Drug) Shortness of Breath        Prior to Admission medications    Medication Sig Start Date End Date Taking? Authorizing Provider   diazePAM (VALIUM) 5 mg tablet Take 1 Tab by mouth every eight (8) hours as needed for Anxiety. Max Daily Amount: 15 mg. 3/24/17   Jaswant Bains NP   HUMULIN R 500 unit/mL soln 0.04 mL by SubCUTAneous route Before breakfast, lunch, and dinner. 11/23/15   Lexa Nicole MD   topiramate (TOPAMAX) 100 mg tablet Take 100 mg by mouth daily. 8/31/15   Catherine Ramirez MD   simvastatin (ZOCOR) 40 mg tablet Take 1 Tab by mouth nightly. 8/7/14   Anita Reddy NP   amitriptyline (ELAVIL) 75 mg tablet Take  by mouth nightly. Historical Provider   carvedilol (COREG) 6.25 mg tablet Take  by mouth two (2) times daily (with meals).     Historical Provider         Review of Systems:    Gen:  Eyes:  ENT:  CVS:   chest painPulm:  GI:    :    MS:  Skin:  Psych:  Endo:    Hem:  Renal:    Neuro:            Objective:      VITALS:    Vital signs reviewed; most recent are:    Visit Vitals    BP (!) 154/95    Pulse 79    Temp 98.4 °F (36.9 °C)    Resp 15    Ht 5' 7\" (1.702 m)    Wt 97.1 kg (214 lb)    SpO2 97%    BMI 33.52 kg/m2     SpO2 Readings from Last 6 Encounters:   04/04/17 97%   03/24/17 97%   02/02/17 99%   05/23/16 95%   11/08/15 96%   10/12/15 96%        No intake or output data in the 24 hours ending 04/04/17 0236         Exam:     Physical Exam:    Gen:  Well-developed, well-nourished, in no acute distress  HEENT:  Pink conjunctivae, PERRL, hearing intact to voice, moist mucous membranes  Neck:  Supple, without masses, thyroid non-tender  Resp:  No accessory muscle use, clear breath sounds without wheezes rales or rhonchi  Card:  No murmurs, normal S1, S2 without thrills, bruits or peripheral edema  Abd:  Soft, non-tender, non-distended, normoactive bowel sounds are present, no palpable organomegaly  Lymph:  No cervical adenopathy  Musc:  No cyanosis or clubbing  Skin:  No rashes or ulcers, skin turgor is good  Neuro:  Cranial nerves 3-12 are grossly intact,  strength is 5/5 bilaterally, dorsi / plantarflexion strength is 5/5 bilaterally, follows commands appropriately  Psych:  Alert with good insight. Oriented to person, place, and time       Labs:    Recent Labs      04/03/17   2253   WBC  5.4   HGB  12.1   HCT  36.1   PLT  217     Recent Labs      04/03/17   2253   NA  140   K  3.6   CL  106   CO2  22   GLU  228*   BUN  8   CREA  0.94   CA  8.4*   ALB  3.4*   SGOT  12*   ALT  23     No components found for: GLPOC  No results for input(s): PH, PCO2, PO2, HCO3, FIO2 in the last 72 hours. No results for input(s): INR in the last 72 hours. No lab exists for component: INREXT    Chest Xray:  No acute process  EKG reviewed:   Normal sinus rhythm       Assessment/Plan:       Chest pain: EKG non ischemic, first TnI negative. Trend ce. Order nitropaste. Order aspirin, lipid panel and A1c.  Consult cardiology    Type 2 diabetes mellitus: order lantus at decreased dose and SSI    Hyperlipidemia: resume statin    HTN: resume coreg      Total time spent with patient: 79 [facility] discussed with: Patient    Discussed:  Care Plan    Prophylaxis:  Lovenox    Probable Disposition:  Home w/Family           ___________________________________________________    Attending Physician: Vicki Staton MD

## 2017-04-04 NOTE — ED PROVIDER NOTES
Patient is a 46 y.o. female presenting with chest pain. Chest Pain (Angina)    Associated symptoms include headaches and nausea. Pertinent negatives include no abdominal pain, no cough, no fever, no shortness of breath and no vomiting. 46year old female with anxiety, arthritis, chronic pain, DM, HTN, CHOl, presents with sharp chest pain with shortness of breath, nausea, radiation to shoulder onset at 6pm while at rest. Has recurrent pain, seems to be worse with anxiety. Her cardiologist in [address] no longer takes her insurance. Last stress test 1 year ago. Reports cardiac cath in [place] 2 years ago. No leg pain or swelling. Reports history of blood clot in lung but not on anticoagulants. Took tylenol at home as well as NItro without relief. Seen here recently for same, feb and march. Has not followed up in any way due to insurance issues. Reports mom with stents and younger sister with stents. Reports exertional symptoms. Reports compliance with medications. States this episode is more intense then previous episodes that have brought her to the ED. States allergy to aspirin and NDSAIDs- denies asthma but states they cause SOB. Cardiac risk factors include IDDM, HTN, CHOl and family history. Denies smoking.      Past Medical History:   Diagnosis Date    Anxiety     Arthritis     Autoimmune disease ([address]     Bronchitis     CAD (coronary artery disease)     Chronic pain     fibromyalgia    Diabetes ([address]     Fibromyalgia     GERD (gastroesophageal reflux disease)     H/O pericarditis     High cholesterol     History of continuous positive airway pressure (CPAP) therapy     Hypertension     Irregular heart beat     Morbid obesity ([address]     Other ill-defined conditions     sleep apnea    Thromboembolus ([address]     2010 Leg DVT    Unspecified sleep apnea     cpap       Past Surgical History:   Procedure Laterality Date    HX HEENT  2009    tonsillectomy    HX ORTHOPAEDIC  2010 right knee patellar surgery with hardware    HX IRVING AND BSO  2005    hysterectomy         Family History:   Problem Relation Age of Onset    Diabetes Mother     Hypertension Mother     Heart Disease Mother     Diabetes Father     Hypertension Father     Heart Disease Father     Diabetes Sister     Diabetes Sister     Hypertension Sister        Social History     Social History    Marital status:      Spouse name: N/A    Number of children: N/A    Years of education: N/A     Occupational History    Not on file. Social History Main Topics    Smoking status: Never Smoker    Smokeless tobacco: Never Used    Alcohol use No    Drug use: No    Sexual activity: Not Currently     Other Topics Concern    Not on file     Social History Narrative         ALLERGIES: Aspirin and Nsaids (non-steroidal anti-inflammatory drug)    Review of Systems   Constitutional: Negative for fever. HENT: Negative for congestion. Eyes: Positive for visual disturbance. Respiratory: Negative for cough and shortness of breath. Cardiovascular: Positive for chest pain. Gastrointestinal: Positive for nausea. Negative for abdominal pain and vomiting. Endocrine: Negative for polyuria. Genitourinary: Negative for dysuria. Musculoskeletal: Negative for gait problem. Neurological: Positive for headaches. Psychiatric/Behavioral: Negative for dysphoric mood. Vitals:    04/03/17 2234   BP: (!) 141/103   Pulse: (!) 103   Resp: 20   Temp: 98.4 °F (36.9 °C)   SpO2: 95%   Weight: 97.1 kg (214 lb)   Height: 5' 7\" (1.702 m)            Physical Exam   Constitutional: She is oriented to person, place, and time. She appears well-developed and well-nourished. No distress. HENT:   Head: Normocephalic and atraumatic. Mouth/Throat: Oropharynx is clear and moist. No oropharyngeal exudate. Eyes: Conjunctivae and EOM are normal. Pupils are equal, round, and reactive to light. Right eye exhibits no discharge.  Left eye exhibits no discharge. No scleral icterus. Neck: Normal range of motion. Neck supple. No JVD present. Cardiovascular: Normal rate, regular rhythm, normal heart sounds and intact distal pulses. Exam reveals no gallop and no friction rub. No murmur heard. Pulmonary/Chest: Effort normal and breath sounds normal. No stridor. No respiratory distress. She has no wheezes. She has no rales. She exhibits no tenderness. Abdominal: Soft. Bowel sounds are normal. She exhibits no distension and no mass. There is no tenderness. There is no rebound and no guarding. Musculoskeletal: Normal range of motion. She exhibits no edema or tenderness. Neurological: She is alert and oriented to person, place, and time. She has normal reflexes. No cranial nerve deficit. She exhibits normal muscle tone. Coordination normal.   Skin: Skin is warm and dry. No rash noted. No erythema. Psychiatric: She has a normal mood and affect. Her behavior is normal. Judgment and thought content normal.        MDM  ED Course       Procedures    ED EKG interpretation:  Rhythm: normal sinus rhythm; and regular . Rate (approx.): 89; Axis: normal; P wave: normal; QRS interval: normal ; ST/T wave: non-specific changes; . This EKG was interpreted by Kamari Conde MD,ED Provider. work up negative so far- nitro with decrease in BP but no change in pain. Patient with every risk factor for heart disease including younger sister, has not been able to follow up with cardiology due to insurance reasons. 3rd ED visit for same with worsening symptoms- will discuss admission with [facility] for further work up.

## 2017-04-04 NOTE — PROGRESS NOTES
[facility]: MED RECONCILIATION     Comments/Recommendations:   · Patient is awake and alert  · Verifies allergies  · Reports compliance to prescribed regimen  · Pharmacy: [facility]  · [facility] called and list reviewed with pharmacist  ·  reviewed  · Blood sugar  ¨ The patient reports labile blood sugars with high and low readings but more high readings  ¨ Average morning fasting is 300  ¨ Average pre meal can be up to 500  ¨ The patient's PCP Dr. Camelia Gillette has been increasing her insulin and she has an appointment scheduled in May with an endocrinologist at [facility]  · Blood pressure  ¨ Not monitored at home  · Unknown medication for cholesterol  ¨ Patient did not have a medication list or pill bottles present in the room at the time of interview. The pharmacist will call [facility] to verify the list below when they open this morning. ¨ Medication clarified to be atorvastatin 40mg daily  · Verapamil Er 120mg  ¨ The patient initially reported she was taking this medication for blood pressure but pharmacist was unable to document a recent prescription refill history. Pharmacist returned to the patient's room to clarify the status of verapamil. The patient reports she stopped verapamil at the direction of her provider \"a long time ago\"    Medications added:      · Lantus  · Humalog  · Clonazepam  · Ambien  · Atorvastatin     Medications removed:     · Carvedilol 6.25mg bid  · Diazepam 5mg every 8 hours prn  · Humulin R 0.04ml prior three times daily prior to meals     Medications adjusted:     · Topiramate changed from 100mg daily to 100mg bid     Allergies: Aspirin and Nsaids (non-steroidal anti-inflammatory drug)     Prior to Admission Medications:      Prior to Admission Medications   Prescriptions Last Dose Informant Patient Reported? Taking?   amitriptyline (ELAVIL) 75 mg tablet 4/3/2017 at Unknown time  Yes Yes   Sig: Take 75 mg by mouth nightly.    atorvastatin (LIPITOR) 40 mg tablet 4/3/2017 at Unknown time  Yes Yes   Sig: Take 40 mg by mouth daily. clonazePAM (KLONOPIN) 0.5 mg tablet 4/3/2017 at Unknown time  Yes Yes   Sig: Take 0.5 mg by mouth three (3) times daily. Indications: anxiety   insulin glargine (LANTUS SOLOSTAR) 100 unit/mL (3 mL) pen 4/3/2017 at Unknown time  Yes Yes   Si Units by SubCUTAneous route daily. insulin lispro (HUMALOG) 100 unit/mL kwikpen 4/3/2017 at Unknown time  Yes Yes   Sig: by SubCUTAneous route Before breakfast, lunch, dinner and at bedtime. Sliding scale   topiramate (TOPAMAX) 100 mg tablet 4/3/2017 at Unknown time  Yes Yes   Sig: Take 100 mg by mouth two (2) times a day. zolpidem (AMBIEN) 10 mg tablet   Yes Yes   Sig: Take 10 mg by mouth nightly as needed for Sleep.       Facility-Administered Medications: None      Thank you,        Migdalia Thurston, PharmD, BCPS

## 2017-04-04 NOTE — ED NOTES
Pt called out c/o of CP. Pt reports pain was better with Morphine but is returning. Pt states the SL Nitro did not help her pain, but it did give her a HA. EKG performed: NSR given to ED MD to sign off and then scanned to pt chart. Dr. Jim Sorto called and gave okay to give Nitro paste early. Pt also given Tylenol to prevent further HA.

## 2017-04-04 NOTE — PROGRESS NOTES
Bedside and Verbal shift change report given to Meghan Beck RN (oncoming nurse) by Nathen Armstrong RN (offgoing nurse). Report included the following information SBAR, Kardex, ED Summary and Recent Results.

## 2017-04-05 ENCOUNTER — APPOINTMENT (OUTPATIENT)
Dept: CT IMAGING | Age: 42
End: 2017-04-05
Attending: INTERNAL MEDICINE
Payer: MEDICAID

## 2017-04-05 VITALS
DIASTOLIC BLOOD PRESSURE: 70 MMHG | WEIGHT: 214 LBS | SYSTOLIC BLOOD PRESSURE: 111 MMHG | OXYGEN SATURATION: 97 % | HEART RATE: 69 BPM | HEIGHT: 67 IN | RESPIRATION RATE: 16 BRPM | TEMPERATURE: 98.6 F | BODY MASS INDEX: 33.59 KG/M2

## 2017-04-05 PROBLEM — R07.9 CHEST PAIN AT REST: Status: ACTIVE | Noted: 2017-04-05

## 2017-04-05 LAB
ANION GAP BLD CALC-SCNC: 12 MMOL/L (ref 5–15)
BUN SERPL-MCNC: 8 MG/DL (ref 6–20)
BUN/CREAT SERPL: 9 (ref 12–20)
CALCIUM SERPL-MCNC: 8.6 MG/DL (ref 8.5–10.1)
CHLORIDE SERPL-SCNC: 106 MMOL/L (ref 97–108)
CHOLEST SERPL-MCNC: 175 MG/DL
CO2 SERPL-SCNC: 22 MMOL/L (ref 21–32)
CREAT SERPL-MCNC: 0.92 MG/DL (ref 0.55–1.02)
ERYTHROCYTE [DISTWIDTH] IN BLOOD BY AUTOMATED COUNT: 13.4 % (ref 11.5–14.5)
GLUCOSE BLD STRIP.AUTO-MCNC: 171 MG/DL (ref 65–100)
GLUCOSE BLD STRIP.AUTO-MCNC: 200 MG/DL (ref 65–100)
GLUCOSE BLD STRIP.AUTO-MCNC: 247 MG/DL (ref 65–100)
GLUCOSE SERPL-MCNC: 222 MG/DL (ref 65–100)
HCT VFR BLD AUTO: 37.8 % (ref 35–47)
HDLC SERPL-MCNC: 40 MG/DL
HDLC SERPL: 4.4 {RATIO} (ref 0–5)
HGB BLD-MCNC: 12.2 G/DL (ref 11.5–16)
LDLC SERPL CALC-MCNC: 73.6 MG/DL (ref 0–100)
LIPID PROFILE,FLP: ABNORMAL
MAGNESIUM SERPL-MCNC: 1.6 MG/DL (ref 1.6–2.4)
MCH RBC QN AUTO: 28.1 PG (ref 26–34)
MCHC RBC AUTO-ENTMCNC: 32.3 G/DL (ref 30–36.5)
MCV RBC AUTO: 87.1 FL (ref 80–99)
PHOSPHATE SERPL-MCNC: 3.7 MG/DL (ref 2.6–4.7)
PLATELET # BLD AUTO: 227 K/UL (ref 150–400)
POTASSIUM SERPL-SCNC: 3.7 MMOL/L (ref 3.5–5.1)
RBC # BLD AUTO: 4.34 M/UL (ref 3.8–5.2)
SERVICE CMNT-IMP: ABNORMAL
SODIUM SERPL-SCNC: 140 MMOL/L (ref 136–145)
TRIGL SERPL-MCNC: 307 MG/DL (ref ?–150)
TROPONIN I SERPL-MCNC: <0.04 NG/ML
VLDLC SERPL CALC-MCNC: 61.4 MG/DL
WBC # BLD AUTO: 7.3 K/UL (ref 3.6–11)

## 2017-04-05 PROCEDURE — 80061 LIPID PANEL: CPT | Performed by: INTERNAL MEDICINE

## 2017-04-05 PROCEDURE — 84100 ASSAY OF PHOSPHORUS: CPT | Performed by: INTERNAL MEDICINE

## 2017-04-05 PROCEDURE — 80048 BASIC METABOLIC PNL TOTAL CA: CPT | Performed by: INTERNAL MEDICINE

## 2017-04-05 PROCEDURE — 82962 GLUCOSE BLOOD TEST: CPT

## 2017-04-05 PROCEDURE — 99218 HC RM OBSERVATION: CPT

## 2017-04-05 PROCEDURE — 74011250637 HC RX REV CODE- 250/637: Performed by: INTERNAL MEDICINE

## 2017-04-05 PROCEDURE — 36415 COLL VENOUS BLD VENIPUNCTURE: CPT | Performed by: INTERNAL MEDICINE

## 2017-04-05 PROCEDURE — 85027 COMPLETE CBC AUTOMATED: CPT | Performed by: INTERNAL MEDICINE

## 2017-04-05 PROCEDURE — 74011250636 HC RX REV CODE- 250/636: Performed by: INTERNAL MEDICINE

## 2017-04-05 PROCEDURE — 74011636637 HC RX REV CODE- 636/637: Performed by: INTERNAL MEDICINE

## 2017-04-05 PROCEDURE — 71275 CT ANGIOGRAPHY CHEST: CPT

## 2017-04-05 PROCEDURE — 83735 ASSAY OF MAGNESIUM: CPT | Performed by: INTERNAL MEDICINE

## 2017-04-05 PROCEDURE — 74011636320 HC RX REV CODE- 636/320: Performed by: RADIOLOGY

## 2017-04-05 PROCEDURE — 84484 ASSAY OF TROPONIN QUANT: CPT | Performed by: INTERNAL MEDICINE

## 2017-04-05 PROCEDURE — 93306 TTE W/DOPPLER COMPLETE: CPT

## 2017-04-05 RX ORDER — NAPHAZOLINE HYDROCHLORIDE AND POLYETHYLENE GLYCOL 300 .1; 2 MG/ML; MG/ML
SOLUTION/ DROPS OPHTHALMIC
Qty: 100 SYRINGE | Refills: 2 | Status: SHIPPED | OUTPATIENT
Start: 2017-04-05

## 2017-04-05 RX ORDER — OXYCODONE AND ACETAMINOPHEN 5; 325 MG/1; MG/1
1 TABLET ORAL
Qty: 20 TAB | Refills: 0 | Status: SHIPPED | OUTPATIENT
Start: 2017-04-05 | End: 2017-09-24

## 2017-04-05 RX ORDER — INSULIN GLARGINE 100 [IU]/ML
40 INJECTION, SOLUTION SUBCUTANEOUS
Qty: 1 EACH | Refills: 2 | Status: SHIPPED | OUTPATIENT
Start: 2017-04-05 | End: 2019-02-05

## 2017-04-05 RX ORDER — LISINOPRIL 5 MG/1
5 TABLET ORAL DAILY
Qty: 30 TAB | Refills: 1 | Status: SHIPPED | OUTPATIENT
Start: 2017-04-05 | End: 2018-08-22

## 2017-04-05 RX ORDER — INSULIN LISPRO 100 [IU]/ML
6 INJECTION, SOLUTION INTRAVENOUS; SUBCUTANEOUS
Qty: 1 VIAL | Refills: 2 | Status: SHIPPED | OUTPATIENT
Start: 2017-04-05

## 2017-04-05 RX ORDER — PANTOPRAZOLE SODIUM 20 MG/1
20 TABLET, DELAYED RELEASE ORAL DAILY
Qty: 30 TAB | Refills: 1 | Status: SHIPPED | OUTPATIENT
Start: 2017-04-05 | End: 2017-09-24

## 2017-04-05 RX ORDER — GUAIFENESIN 100 MG/5ML
81 LIQUID (ML) ORAL DAILY
Qty: 30 TAB | Refills: 1 | Status: SHIPPED | OUTPATIENT
Start: 2017-04-05 | End: 2017-09-24

## 2017-04-05 RX ORDER — LANCETS 28 GAUGE
EACH MISCELLANEOUS
Qty: 100 LANCET | Refills: 2 | Status: SHIPPED | OUTPATIENT
Start: 2017-04-05

## 2017-04-05 RX ADMIN — Medication 2 MG: at 15:11

## 2017-04-05 RX ADMIN — Medication 10 ML: at 15:12

## 2017-04-05 RX ADMIN — INSULIN GLARGINE 36 UNITS: 100 INJECTION, SOLUTION SUBCUTANEOUS at 09:01

## 2017-04-05 RX ADMIN — Medication 10 ML: at 15:11

## 2017-04-05 RX ADMIN — INSULIN LISPRO 2 UNITS: 100 INJECTION, SOLUTION INTRAVENOUS; SUBCUTANEOUS at 13:52

## 2017-04-05 RX ADMIN — Medication 2 MG: at 10:40

## 2017-04-05 RX ADMIN — LISINOPRIL 5 MG: 5 TABLET ORAL at 09:01

## 2017-04-05 RX ADMIN — Medication 10 ML: at 06:08

## 2017-04-05 RX ADMIN — ASPIRIN 81 MG CHEWABLE TABLET 81 MG: 81 TABLET CHEWABLE at 09:01

## 2017-04-05 RX ADMIN — IOPAMIDOL 90 ML: 755 INJECTION, SOLUTION INTRAVENOUS at 13:06

## 2017-04-05 RX ADMIN — CLONAZEPAM 0.5 MG: 0.5 TABLET ORAL at 09:01

## 2017-04-05 RX ADMIN — TOPIRAMATE 100 MG: 100 TABLET, FILM COATED ORAL at 09:01

## 2017-04-05 RX ADMIN — ENOXAPARIN SODIUM 40 MG: 100 INJECTION SUBCUTANEOUS at 06:13

## 2017-04-05 RX ADMIN — ATORVASTATIN CALCIUM 40 MG: 20 TABLET, FILM COATED ORAL at 09:01

## 2017-04-05 RX ADMIN — ACETAMINOPHEN 650 MG: 325 TABLET ORAL at 06:46

## 2017-04-05 RX ADMIN — CLONAZEPAM 0.5 MG: 0.5 TABLET ORAL at 15:11

## 2017-04-05 NOTE — DIABETES MGMT
DTC Consult Note     POC Glucose last 24hrs:     Lab Results   Component Value Date/Time     (H) 2017 12:57 AM    GLUCPOC 200 (H) 2017 07:21 AM    GLUCPOC 335 (H) 2017 09:01 PM    GLUCPOC 287 (H) 2017 06:53 PM        Recommendations/ Comments: Noted Humalog HELD at 0700 and at 1100. PLEASE DO NOT HOLD IF NPO. If appropriate, please consider the followin. Discharging on normal sensitivity sliding scale  2. A mealtime insulin - Humalog 6 units with meals   *weight based dosage calculation (TDD = 0.3-0.5 units/ kg/ day; 50% basal, 50% prandial) based on AACE T2D Algorithm, Executive Summary, Endocr Pract. 2016; 22(No. 1)  3. Increasing Lantus from 36 units to 40 units as fasting glucose remains >140     Pt was provided with a Freestyle Lite Meter and will require the following prescriptions at discharge:  -Freestyle Lancets  -Freestyle Lite Test Strips     Consult received from Edilson Francis MD  for  [x]    Assessment of home management  []    Meal planning  [x]    Meter / Monitoring  []    New Diagnosis  [x]    Insulin education  []    Insulin pump notification  [x]    Medication recommendations  [x]    Hospital glucose management  []    Gilberto Mueller  [x]    Outpatient Education - Information forwarded to [address] [place] [address] who will follow-up with pt 1 week after discharge     Hospital (inpatient) medications:  1. Correction Scale: Lispro (Humalog) Normal Sensitivity scale to cover for glucose > 139 mg/dL before meals and for glucose >199 at bedtime      2. Lantus 36 units - 1st dose to be given today - increased from 20 units     Assessment / Plan:     Chart reviewed and initial evaluation complete on [address] [place] [address] . [facility] 47 [facility] AA female with a past medical history of  DM  per Dr. Vicki Staton MD's H&P dated 2017. Had a history of insulin pump usage, but A1C was 10% at its lowest. Per patient home medications are  1.  Lantus - start at 25 units  Every morning. Pt is to increase her dose by 1 unit every morning if glucose is not at target. Her PCP stated she will likely end up at around 50 units. She is currently receiving 34 units   2. Humalog based on a sliding scale. A1C:   Lab Results   Component Value Date/Time    Hemoglobin A1c 11.7 04/04/2017 02:35 AM    Hemoglobin A1c 13.8 07/24/2014 09:29 AM       Assessed and provided patient verbal and/or written information on the following  · Diabetes: disease process   · Blood sugar goals   · Causes of high / low blood glucose and treatment  · Lab results: A1C - target   · Blood sugar monitoring:  monitors blood glucose at home 3 (times) per day, and states she is having difficulty obtaining diabetes supplies. Patient reports glucose readings are:  Breakfast: 220-300 - 10units; Lunch: 240-280 - 7 units; Dinner: 400 - gives 12 units. · Site rotation  · Use of insulin pen  · Self-injection of insulin   · Use of sliding scale / correction formula  · Use of insulin to carbohydrate ratio  · Sharps disposal  · Sick day guidelines  · Meal planning: currently, pt eats 2 meals and a snack  · Activity guidelines   · Foot care  · Chronic complications and Standards of Care  · Delayed healing      Encouraged the following:   · increased exercise, dietary modifications: eat 45 grams of carbohydrate per meal, regular blood sugar monitoring: 3 times daily, usage of mealtime insulin     Provided patient with the following: [x]    Diabetes Self-Care Guide                [x]    Outpatient DTC contact number               []    Glucometer               [x]    Insulin Education Guide               []    Carbohydrate Counting Guide               []    Sample meal plan                 []    Chair exercises guide               []    [employer] Product Catalog    Thank you.     Margo Mix, Certified Diabetes Educator    [phone]

## 2017-04-05 NOTE — PROGRESS NOTES
Bedside and Verbal shift change report given to Nubia Smith (oncoming nurse) by Tristan Amezcua (offgoing nurse). Report included the following information SBAR, Kardex, MAR and Cardiac Rhythm NSR.

## 2017-04-05 NOTE — DISCHARGE SUMMARY
Royce Jacobsen [facility] [address], [place], [address] [facility]  [phone]    Physician Discharge Summary     Patient ID:  Renetta Fermin  [id number]  46 y.o.  1975    Admit date: 4/3/2017    Discharge date and time: 4/5/2017    Admission Diagnoses: chest pain    Discharge Diagnoses:  Principal Diagnosis Chest pain at rest                                            Principal Problem:    Chest pain at rest (4/5/2017)    Active Problems:    HTN (hypertension) (1/2/2013)      Morbid obesity ([facility] (1/2/2013)      Uncontrolled diabetes mellitus ([facility] (7/24/2014)      CAD (coronary artery disease) (4/4/2017)           Hospital Course:     45 yo hx of HTN, DM, CAD, morbid obesity, DVT, presented w/ chest pain     1) Chest pain:  Unclear etiology. Initiall concerning for unstable angina given known hx of CAD (40% RCA occlusion on prior cath). EKG and cardiac enzymes neg. Chest CTA neg for PE. Cards performed a heart cath with was negative. Patient was started on low dose ASA. Cont statin     2) HTN: cards started low dose lisinopril     3) Hyperlipidemia: LDL 73, cont statin     4) DM type 2 w/o complications: D5J 81.8%. DM educator was following. Lantus increased to 40u daily.   Humalog 6u TID-AC meals was started     5) Morbid obesity: recommended wt loss     6) Fibromyalgia: cont topamax, elavil    PCP: NANCY House     Consults: Cardiology    Significant Diagnostic Studies: heart cath, chest CTA    Discharge Exam:  Physical Exam:    Gen: Well-developed, well-nourished, morbidly obese, mild distress  HEENT: Pink conjunctivae, PERRL, hearing intact to voice, moist mucous membranes  Neck: Supple, without masses, thyroid non-tender  Resp: No accessory muscle use, clear breath sounds without wheezes rales or rhonchi  Card: No murmurs, normal S1, S2 without thrills, bruits or peripheral edema  Abd: Soft, non-tender, non-distended, normoactive bowel sounds are present  Musc: No cyanosis or clubbing  Skin: No rashes   Neuro: Cranial nerves 3-12 are grossly intact, follows commands appropriately  Psych: Good insight, oriented to person, place and time, alert    Disposition: home  Discharge Condition: Stable    Patient Instructions:   Current Discharge Medication List      START taking these medications    Details   aspirin 81 mg chewable tablet Take 1 Tab by mouth daily. Qty: 30 Tab, Refills: 1      insulin lispro (HUMALOG) 100 unit/mL injection 6 Units by SubCUTAneous route Before breakfast, lunch, and dinner. Qty: 1 Vial, Refills: 2      lisinopril (PRINIVIL, ZESTRIL) 5 mg tablet Take 1 Tab by mouth daily. Qty: 30 Tab, Refills: 1      oxyCODONE-acetaminophen (PERCOCET) 5-325 mg per tablet Take 1 Tab by mouth every four (4) hours as needed for Pain. Max Daily Amount: 6 Tabs. Qty: 20 Tab, Refills: 0      pantoprazole (PROTONIX) 20 mg tablet Take 1 Tab by mouth daily. Qty: 30 Tab, Refills: 1      Insulin Syringe-Needle U-100 (INSULIN SYRINGE) 1 mL 29 gauge x 1/2\" syrg Use as directed  Qty: 100 Syringe, Refills: 2      lancets (FREESTYLE LANCETS) 28 gauge misc Use as directed  Qty: 100 Lancet, Refills: 2      glucose blood VI test strips (FREESTYLE TEST) strip Use as directed  Qty: 100 Strip, Refills: 2         CONTINUE these medications which have CHANGED    Details   insulin glargine (LANTUS SOLOSTAR) 100 unit/mL (3 mL) pen 40 Units by SubCUTAneous route Daily (before breakfast). Qty: 1 Each, Refills: 2         CONTINUE these medications which have NOT CHANGED    Details   topiramate (TOPAMAX) 100 mg tablet Take 100 mg by mouth two (2) times a day. clonazePAM (KLONOPIN) 0.5 mg tablet Take 0.5 mg by mouth three (3) times daily. Indications: anxiety      zolpidem (AMBIEN) 10 mg tablet Take 10 mg by mouth nightly as needed for Sleep.      amitriptyline (ELAVIL) 75 mg tablet Take 75 mg by mouth nightly. atorvastatin (LIPITOR) 40 mg tablet Take 40 mg by mouth daily.          STOP taking these medications       insulin lispro (HUMALOG) 100 unit/mL kwikpen Comments:   Reason for Stopping:             Activity: Activity as tolerated  Diet: Diabetic Diet  Wound Care: None needed    Follow-up with  Follow-up Information     Follow up With Details Comments [address], [place] Schedule an appointment as soon as possible for a visit in 1 week  Karin Fuentes  [phone]            Follow-up tests/labs none    Signed:  Sherrell Parekh MD  4/5/2017  2:58 PM    I spent 34 min on discharge

## 2017-04-05 NOTE — PROGRESS NOTES
I have left a cab ticket in pt's chart at the nurses station. Nursing will need to call and arrange transport.  Thanks ED Tee

## 2017-04-05 NOTE — PROGRESS NOTES
SHIFT REPORT  Bedside and Verbal shift change report given to Kayy RN (oncoming nurse) by Jordyn Armstrong RN (offgoing nurse). Report included the following information SBAR, Kardex, MAR and Cardiac Rhythm sinus rhythm. SHIFT ASSESSMENT    2030 Pt was reminded to not bend arm, because IV kept beeping. Pt arm placed on a pillow for comfort. 2050 MD Lenora Villanueva was called for heart rate elevated over 106. MD said to be called if gets to 120 HR.    2317 Pt IV beeping again. Pt sleeping deep.    0208 Pt sleeping, didn't requested pain medication. END SHIFT REPORT  Bedside and Verbal shift change report given to [address] Tatianna Espinoza (oncoming nurse) by Jayashree Lima RN (offgoing nurse). Report included the following information SBAR, Kardex and MAR.

## 2017-04-05 NOTE — PROGRESS NOTES
ECHO    Nml LVEF 55-60%    Informed pt. Singh Cruz MD, [facility]

## 2017-04-05 NOTE — PROGRESS NOTES
Discharge instructions, including information on new medications and post radial cath care, were reviewed with patient. All questions were answered. IV sites and heart monitor were removed. Patient received a copy of her discharge instructions and prescriptions and will be discharge home. [facility] case management supplied patient with a cab voucher.

## 2017-04-05 NOTE — PROGRESS NOTES
Morning and lunchtime humalog correction scale insulin held per Dr. Edgard Carter (pt is NPO for stress test).

## 2017-04-05 NOTE — PROGRESS NOTES
Bedside and Verbal shift change report given to Kayy (oncoming nurse) by Meghan Beck (offgoing nurse).  Report included the following information SBAR, Kardex, Procedure Summary, Intake/Output, MAR, Accordion and Cardiac Rhythm SR.

## 2017-04-05 NOTE — PROGRESS NOTES
Shift Summary     0230    Verbal shift change report given to Armen Rocha RN (oncoming nurse) by Stanley Jules RN (offgoing nurse). Report included the following information SBAR, Kardex, [facility], Recent Results and Cardiac Rhythm St.       Patient c/o pain. PRN tylenol given and effective. Patient repositioned with relief. Bedside and Verbal shift change report given to Charlee Grubbs RN (oncoming nurse) by Armen Rocha RN (offgoing nurse).  Report included the following information SBAR, Kardex, MAR, Recent Results and Cardiac Rhythm NSR to ST.

## 2017-04-05 NOTE — DISCHARGE INSTRUCTIONS
HOSPITALIST DISCHARGE INSTRUCTIONS  NAME: Doroteo Rico   :  1975   MRN:  [id number]     Date/Time:  2017 2:57 PM    ADMIT DATE: 4/3/2017     DISCHARGE DATE: 2017     ADMITTING DIAGNOSIS:  Chest pain    DISCHARGE DIAGNOSIS:  Non-cardiac chest pain. Heart cath negative for CAD. Chest CTA negative for Pulmonary embolus    MEDICATIONS:  See after visit summary       · It is important that you take the medication exactly as they are prescribed. · Keep your medication in the bottles provided by the pharmacist and keep a list of the medication names, dosages, and times to be taken in your wallet. · Do not take other medications without consulting your doctor     Pain Management: per above medications    What to do at Home    Recommended diet:  Diabetic Diet    Recommended activity: Activity as tolerated    1) Return to the hospital if you feel worse    2) If you experience any of the following symptoms then please call your primary care physician or return to the emergency room if you cannot get hold of your doctor:  Fever, chills, nausea, vomiting, diarrhea, change in mentation, falling, bleeding, shortness of breath, chest pain, severe headache, severe abdominal pain,     3) Notice the changes in your diabetes medications    4) follow up with your doctor soon    Follow Up: Follow-up Information     Follow up With Details Comments [address], [place] Schedule an appointment as soon as possible for a visit in 1 week  Karin Fuentes  [phone]              Information obtained by :  I understand that if any problems occur once I am at home I am to contact my physician. I understand and acknowledge receipt of the instructions indicated above.                                                                                                                                            Physician's or R.N.'s Signature Date/Time                                                                                                                                              Patient or Representative Signature                                                          Date/Time        Radial Cardiac Catheterization/Angiography Discharge Instructions    It is normal to feel tired the first couple days. Take it easy and follow the physicians instructions. CHECK THE CATHETER INSERTION SITE DAILY:    Remove the wrist dressing 24 hours after the procedure. You may shower 24 hours after the procedure. Wash with soap and water and pat dry. Gentle cleaning of the site with soap and water is sufficient, cover with a dry clean dressing or bandage. Do not apply creams or powders to the area. No soaking the wrist for 3 days. Leave the puncture site open to air after 24 hours post-procedure. CALL THE PHYSICIANS:     If the site becomes red, swollen or feels warm to the touch  If there is bleeding or drainage or if there is unusual pain at the radial site. If there is any minor oozing, you may apply a band-aid and remove after 12 hours. If the bleeding continues, hold pressure with the middle finger against the puncture site and the thumb against the back of the wrist,call 911 to be transported to the hospital.  DO NOT DRIVE YOURSELF, [facility] 098. ACTIVITY:   For the first 24 hours do not manipulate the wrist.  No lifting, pushing or pulling over 3-5 pounds with the affected wrist for 7 daysand no straining the insertion site. Do not life grocery bags or the garbage can, do not run the vacuum  or  for 7 days. Start with short walks as in the hospital and gradually increase as tolerated each day. It is recommended to walk 30 minutes 5-7 days per week. Follow your physicians instructions on activity. Avoid walking outside in extremes of heat or cold.   Walk inside when it is cold and windy or hot and humid. Things to keep in mind:  No driving for at least 24 hours, or as designated by your physician. Limit the number of times you go up and down the stairs  Take rests and pace yourself with activity. Be careful and do not strain with bowel movements. MEDICATIONS:    Take all medications as prescribed  Call your physician if you have any questions  Keep an updated list of your medications with you at all times and give a list to your physician and pharmacist    SIGNS AND SYMPTOMS:   Be cautious of symptoms of angina or recurrent symptoms such as chest discomfort, unusual shortness of breath or fatigue. These could be symptoms of restenosis, a new blockage or a heart attack. If your symptoms are relieved with rest it is still recommended that you notify your physician of recurrent chest pain or discomfort. For CHEST PAIN or symptoms of angina not relieved with rest:  If the discomfort is not relieved with rest, and you have been prescribed Nitroglycerin, take as directed (taken under the tongue, one at a time 5 minutes apart for a total of 3 doses). If the discomfort is not relieved after the 3rd nitroglycerin, call 911. If you have not been prescribed Nitroglycerin  and your chest discomfort is not relieved with rest, call 911. AFTER CARE:   Follow up with your physician as instructed. Follow a heart healthy diet with proper portion control, daily stress management, daily exercise, blood pressure and cholesterol control , and smoking cessation. Chest Pain: Care Instructions  Your Care Instructions  There are many things that can cause chest pain. Some are not serious and will get better on their own in a few days. But some kinds of chest pain need more testing and treatment. Your doctor may have recommended a follow-up visit in the next 8 to 12 hours. If you are not getting better, you may need more tests or treatment.   Even though your doctor has released you, you still need to watch for any problems. The doctor carefully checked you, but sometimes problems can develop later. If you have new symptoms or if your symptoms do not get better, get medical care right away. If you have worse or different chest pain or pressure that lasts more than 5 minutes or you passed out (lost consciousness), call 911 or seek other emergency help right away. A medical visit is only one step in your treatment. Even if you feel better, you still need to do what your doctor recommends, such as going to all suggested follow-up appointments and taking medicines exactly as directed. This will help you recover and help prevent future problems. How can you care for yourself at home? · Rest until you feel better. · Take your medicine exactly as prescribed. Call your doctor if you think you are having a problem with your medicine. · Do not drive after taking a prescription pain medicine. When should you call for help? Call 911 if:  · You passed out (lost consciousness). · You have severe difficulty breathing. · You have symptoms of a heart attack. These may include:  ¨ Chest pain or pressure, or a strange feeling in your chest.  ¨ Sweating. ¨ Shortness of breath. ¨ Nausea or vomiting. ¨ Pain, pressure, or a strange feeling in your back, neck, jaw, or upper belly or in one or both shoulders or arms. ¨ Lightheadedness or sudden weakness. ¨ A fast or irregular heartbeat. After you call 911, the  may tell you to chew 1 adult-strength or 2 to 4 low-dose aspirin. Wait for an ambulance. Do not try to drive yourself. Call your doctor today if:  · You have any trouble breathing. · Your chest pain gets worse. · You are dizzy or lightheaded, or you feel like you may faint. · You are not getting better as expected. · You are having new or different chest pain. Where can you learn more? Go to http://adamaris-liana.info/.   Enter A120 in the search box to learn more about \"Chest Pain: Care Instructions. \"  Current as of: May 27, 2016  Content Version: 11.2  © 2717-2303 [employer], [employer]. Care instructions adapted under license by [employer] (which disclaims liability or warranty for this information). If you have questions about a medical condition or this instruction, always ask your healthcare professional. [address] any warranty or liability for your use of this information.

## 2017-04-19 ENCOUNTER — HOSPITAL ENCOUNTER (OUTPATIENT)
Dept: DIABETES SERVICES | Age: 42
Discharge: HOME | End: 2017-04-19
Payer: MEDICAID

## 2017-04-19 DIAGNOSIS — E11.00 TYPE 2 DIABETES MELLITUS WITH HYPEROSMOLARITY WITHOUT COMA, UNSPECIFIED LONG TERM INSULIN USE STATUS: ICD-10-CM

## 2017-04-19 PROCEDURE — G0108 DIAB MANAGE TRN  PER INDIV: HCPCS

## 2017-07-20 ENCOUNTER — HOSPITAL ENCOUNTER (OUTPATIENT)
Dept: MRI IMAGING | Age: 42
Discharge: HOME | End: 2017-07-20
Attending: ORTHOPAEDIC SURGERY
Payer: MEDICAID

## 2017-07-20 DIAGNOSIS — M75.101 ROTATOR CUFF SYNDROME OF RIGHT SHOULDER: ICD-10-CM

## 2017-07-20 PROCEDURE — 73221 MRI JOINT UPR EXTREM W/O DYE: CPT

## 2017-09-24 ENCOUNTER — APPOINTMENT (OUTPATIENT)
Dept: GENERAL RADIOLOGY | Age: 42
End: 2017-09-24
Attending: EMERGENCY MEDICINE
Payer: MEDICAID

## 2017-09-24 ENCOUNTER — HOSPITAL ENCOUNTER (EMERGENCY)
Age: 42
Discharge: HOME | End: 2017-09-25
Attending: EMERGENCY MEDICINE | Admitting: EMERGENCY MEDICINE
Payer: MEDICAID

## 2017-09-24 DIAGNOSIS — R73.9 HYPERGLYCEMIA: ICD-10-CM

## 2017-09-24 DIAGNOSIS — R07.9 CHEST PAIN, UNSPECIFIED TYPE: Primary | ICD-10-CM

## 2017-09-24 DIAGNOSIS — E86.0 DEHYDRATION: ICD-10-CM

## 2017-09-24 LAB
ALBUMIN SERPL-MCNC: 3.4 G/DL (ref 3.5–5)
ALBUMIN/GLOB SERPL: 0.9 {RATIO} (ref 1.1–2.2)
ALP SERPL-CCNC: 115 U/L (ref 45–117)
ALT SERPL-CCNC: 23 U/L (ref 12–78)
ANION GAP SERPL CALC-SCNC: 13 MMOL/L (ref 5–15)
AST SERPL-CCNC: 10 U/L (ref 15–37)
BASOPHILS # BLD: 0 K/UL (ref 0–0.1)
BASOPHILS NFR BLD: 0 % (ref 0–1)
BILIRUB SERPL-MCNC: 0.1 MG/DL (ref 0.2–1)
BUN SERPL-MCNC: 14 MG/DL (ref 6–20)
BUN/CREAT SERPL: 9 (ref 12–20)
CALCIUM SERPL-MCNC: 9.4 MG/DL (ref 8.5–10.1)
CHLORIDE SERPL-SCNC: 106 MMOL/L (ref 97–108)
CO2 SERPL-SCNC: 24 MMOL/L (ref 21–32)
CREAT SERPL-MCNC: 1.54 MG/DL (ref 0.55–1.02)
EOSINOPHIL # BLD: 0.1 K/UL (ref 0–0.4)
EOSINOPHIL NFR BLD: 1 % (ref 0–7)
ERYTHROCYTE [DISTWIDTH] IN BLOOD BY AUTOMATED COUNT: 13.3 % (ref 11.5–14.5)
GLOBULIN SER CALC-MCNC: 3.7 G/DL (ref 2–4)
GLUCOSE BLD STRIP.AUTO-MCNC: 365 MG/DL (ref 65–100)
GLUCOSE SERPL-MCNC: 414 MG/DL (ref 65–100)
HCT VFR BLD AUTO: 36.4 % (ref 35–47)
HGB BLD-MCNC: 12.2 G/DL (ref 11.5–16)
LYMPHOCYTES # BLD: 2.5 K/UL (ref 0.8–3.5)
LYMPHOCYTES NFR BLD: 46 % (ref 12–49)
MCH RBC QN AUTO: 28.8 PG (ref 26–34)
MCHC RBC AUTO-ENTMCNC: 33.5 G/DL (ref 30–36.5)
MCV RBC AUTO: 85.8 FL (ref 80–99)
MONOCYTES # BLD: 0.3 K/UL (ref 0–1)
MONOCYTES NFR BLD: 6 % (ref 5–13)
NEUTS SEG # BLD: 2.5 K/UL (ref 1.8–8)
NEUTS SEG NFR BLD: 47 % (ref 32–75)
PLATELET # BLD AUTO: 206 K/UL (ref 150–400)
POTASSIUM SERPL-SCNC: 4 MMOL/L (ref 3.5–5.1)
PROT SERPL-MCNC: 7.1 G/DL (ref 6.4–8.2)
RBC # BLD AUTO: 4.24 M/UL (ref 3.8–5.2)
SERVICE CMNT-IMP: ABNORMAL
SODIUM SERPL-SCNC: 143 MMOL/L (ref 136–145)
TROPONIN I SERPL-MCNC: <0.04 NG/ML
WBC # BLD AUTO: 5.3 K/UL (ref 3.6–11)

## 2017-09-24 PROCEDURE — 96375 TX/PRO/DX INJ NEW DRUG ADDON: CPT

## 2017-09-24 PROCEDURE — 96374 THER/PROPH/DIAG INJ IV PUSH: CPT

## 2017-09-24 PROCEDURE — 85025 COMPLETE CBC W/AUTO DIFF WBC: CPT | Performed by: EMERGENCY MEDICINE

## 2017-09-24 PROCEDURE — 36415 COLL VENOUS BLD VENIPUNCTURE: CPT | Performed by: EMERGENCY MEDICINE

## 2017-09-24 PROCEDURE — 74011250636 HC RX REV CODE- 250/636: Performed by: EMERGENCY MEDICINE

## 2017-09-24 PROCEDURE — 84484 ASSAY OF TROPONIN QUANT: CPT | Performed by: EMERGENCY MEDICINE

## 2017-09-24 PROCEDURE — 80053 COMPREHEN METABOLIC PANEL: CPT | Performed by: EMERGENCY MEDICINE

## 2017-09-24 PROCEDURE — 74011250637 HC RX REV CODE- 250/637: Performed by: EMERGENCY MEDICINE

## 2017-09-24 PROCEDURE — 96361 HYDRATE IV INFUSION ADD-ON: CPT

## 2017-09-24 PROCEDURE — 93005 ELECTROCARDIOGRAM TRACING: CPT

## 2017-09-24 PROCEDURE — 99285 EMERGENCY DEPT VISIT HI MDM: CPT

## 2017-09-24 PROCEDURE — 82962 GLUCOSE BLOOD TEST: CPT

## 2017-09-24 PROCEDURE — 71010 XR CHEST PORT: CPT

## 2017-09-24 RX ORDER — SODIUM CHLORIDE 0.9 % (FLUSH) 0.9 %
5-10 SYRINGE (ML) INJECTION AS NEEDED
Status: DISCONTINUED | OUTPATIENT
Start: 2017-09-24 | End: 2017-09-25 | Stop reason: HOSPADM

## 2017-09-24 RX ORDER — ACETAMINOPHEN 500 MG
1000 TABLET ORAL
Status: COMPLETED | OUTPATIENT
Start: 2017-09-24 | End: 2017-09-24

## 2017-09-24 RX ORDER — MORPHINE SULFATE 2 MG/ML
2 INJECTION, SOLUTION INTRAMUSCULAR; INTRAVENOUS
Status: COMPLETED | OUTPATIENT
Start: 2017-09-24 | End: 2017-09-24

## 2017-09-24 RX ORDER — NITROGLYCERIN 0.4 MG/1
0.4 TABLET SUBLINGUAL
COMMUNITY

## 2017-09-24 RX ORDER — ONDANSETRON 2 MG/ML
4 INJECTION INTRAMUSCULAR; INTRAVENOUS
Status: COMPLETED | OUTPATIENT
Start: 2017-09-24 | End: 2017-09-24

## 2017-09-24 RX ORDER — GABAPENTIN 100 MG/1
100 CAPSULE ORAL
COMMUNITY
End: 2017-12-27

## 2017-09-24 RX ORDER — SODIUM CHLORIDE 0.9 % (FLUSH) 0.9 %
5-10 SYRINGE (ML) INJECTION EVERY 8 HOURS
Status: DISCONTINUED | OUTPATIENT
Start: 2017-09-24 | End: 2017-09-25 | Stop reason: HOSPADM

## 2017-09-24 RX ADMIN — SODIUM CHLORIDE 1000 ML: 900 INJECTION, SOLUTION INTRAVENOUS at 21:08

## 2017-09-24 RX ADMIN — MORPHINE SULFATE 2 MG: 2 INJECTION, SOLUTION INTRAMUSCULAR; INTRAVENOUS at 21:08

## 2017-09-24 RX ADMIN — SODIUM CHLORIDE 1000 ML: 900 INJECTION, SOLUTION INTRAVENOUS at 22:35

## 2017-09-24 RX ADMIN — Medication 10 ML: at 21:08

## 2017-09-24 RX ADMIN — ACETAMINOPHEN 1000 MG: 500 TABLET ORAL at 22:35

## 2017-09-24 RX ADMIN — ONDANSETRON 4 MG: 2 INJECTION INTRAMUSCULAR; INTRAVENOUS at 21:08

## 2017-09-25 ENCOUNTER — APPOINTMENT (OUTPATIENT)
Dept: CT IMAGING | Age: 42
End: 2017-09-25
Attending: EMERGENCY MEDICINE
Payer: MEDICAID

## 2017-09-25 VITALS
OXYGEN SATURATION: 95 % | BODY MASS INDEX: 35 KG/M2 | DIASTOLIC BLOOD PRESSURE: 77 MMHG | TEMPERATURE: 98 F | WEIGHT: 223 LBS | HEART RATE: 111 BPM | RESPIRATION RATE: 16 BRPM | SYSTOLIC BLOOD PRESSURE: 120 MMHG | HEIGHT: 67 IN

## 2017-09-25 LAB
ATRIAL RATE: 124 BPM
CALCULATED P AXIS, ECG09: 34 DEGREES
CALCULATED R AXIS, ECG10: 3 DEGREES
CALCULATED T AXIS, ECG11: 24 DEGREES
DIAGNOSIS, 93000: NORMAL
GLUCOSE BLD STRIP.AUTO-MCNC: 307 MG/DL (ref 65–100)
P-R INTERVAL, ECG05: 152 MS
Q-T INTERVAL, ECG07: 318 MS
QRS DURATION, ECG06: 86 MS
QTC CALCULATION (BEZET), ECG08: 456 MS
SERVICE CMNT-IMP: ABNORMAL
TROPONIN I SERPL-MCNC: <0.04 NG/ML
VENTRICULAR RATE, ECG03: 124 BPM

## 2017-09-25 PROCEDURE — 74011636320 HC RX REV CODE- 636/320: Performed by: RADIOLOGY

## 2017-09-25 PROCEDURE — 82962 GLUCOSE BLOOD TEST: CPT

## 2017-09-25 PROCEDURE — 71275 CT ANGIOGRAPHY CHEST: CPT

## 2017-09-25 RX ADMIN — IOPAMIDOL 92 ML: 755 INJECTION, SOLUTION INTRAVENOUS at 00:58

## 2017-09-25 RX ADMIN — IOPAMIDOL 76 ML: 755 INJECTION, SOLUTION INTRAVENOUS at 00:49

## 2017-09-25 NOTE — ED PROVIDER NOTES
HPI Comments: 38 y.o. female with past medical history significant for DM, HTN, elevated cholesterol, fibromyalgia, arthritis, GERD, CAD, h/o pericarditis, h/o DVT (no current anticoagulation), who presents via EMS with chief complaint of substernal and left chest pain today, worsening two hours ago. Pt reports that her pain has been \"sharp and stabbing\" in quality, and is currently a 9/10 in severity. She has NTG at home to take for angina, and she took two doses of NTG PTA without significant relief. Pt also c/o nausea and diaphoresis associated with the pain, but she denies any vomiting. She also notes that she has had a recent cough. She denies any h/o PCI, but records review indicates that she was admitted to the hospital in 04/2017 for chest pain of unclear etiology. At that time she had a had a normal cardiac catheterization. Pt specifically denies any SOB, fevers, abdominal pain, and back pain. Of note, pt reports her BG levels have been running between 240-300 mg/dL. There are no other acute medical concerns at this time. Social hx: - Tobacco, - EtOH, - Illicit Drug Abuse   PCP: NANCY Thomason  Cardiology: Lisset Lemus MD     Note written by Cristela Macias, as dictated by Aria Manning, DO 9:02 PM     The history is provided by the patient. No  was used.         Past Medical History:   Diagnosis Date    Anxiety     Arthritis     Autoimmune disease ([address]     Bronchitis     CAD (coronary artery disease)     Chronic pain     fibromyalgia    Diabetes ([address]     Fibromyalgia     GERD (gastroesophageal reflux disease)     H/O pericarditis     High cholesterol     History of continuous positive airway pressure (CPAP) therapy     Hypertension     Irregular heart beat     Morbid obesity ([address]     Other ill-defined conditions     sleep apnea    Thromboembolus ([address]     2010 Leg DVT    Unspecified sleep apnea     cpap       Past Surgical History: Procedure Laterality Date    HX HEENT  2009    tonsillectomy    HX ORTHOPAEDIC  2010    right knee patellar surgery with hardware    HX IRVING AND BSO  2005    hysterectomy         Family History:   Problem Relation Age of Onset    Diabetes Mother     Hypertension Mother     Heart Disease Mother     Diabetes Father     Hypertension Father     Heart Disease Father     Diabetes Sister     Diabetes Sister     Hypertension Sister        Social History     Social History    Marital status:      Spouse name: N/A    Number of children: N/A    Years of education: N/A     Occupational History    Not on file. Social History Main Topics    Smoking status: Never Smoker    Smokeless tobacco: Never Used    Alcohol use No    Drug use: No    Sexual activity: Not Currently     Other Topics Concern    Not on file     Social History Narrative     ALLERGIES: Aspirin; Nsaids (non-steroidal anti-inflammatory drug); and Tramadol    Review of Systems   Constitutional: Positive for diaphoresis. Negative for appetite change, chills, fever and unexpected weight change. HENT: Negative for ear pain, hearing loss, rhinorrhea and trouble swallowing. Eyes: Negative for pain and visual disturbance. Respiratory: Positive for cough. Negative for chest tightness and shortness of breath. Cardiovascular: Positive for chest pain. Negative for palpitations. Gastrointestinal: Positive for nausea. Negative for abdominal distention, abdominal pain, blood in stool and vomiting. Genitourinary: Negative for dysuria, hematuria and urgency. Musculoskeletal: Negative for back pain and myalgias. Skin: Negative for rash. Neurological: Negative for dizziness, syncope, weakness and numbness. Psychiatric/Behavioral: Negative for confusion and suicidal ideas. All other systems reviewed and are negative.       Vitals:    09/24/17 2054 09/24/17 2100 09/24/17 2130 09/24/17 2200   BP: 122/78 119/79 110/76 120/85 Pulse: (!) 124   (!) 119   Resp: 20   18   Temp: 98 °F (36.7 °C)      SpO2: 98% 97%  95%   Weight: 101.2 kg (223 lb)      Height: 5' 7\" (1.702 m)               Physical Exam   Constitutional: She is oriented to person, place, and time. She appears well-developed and well-nourished. No distress. HENT:   Head: Normocephalic and atraumatic. Right Ear: External ear normal.   Left Ear: External ear normal.   Nose: Nose normal.   Mouth/Throat: Oropharynx is clear and moist. No oropharyngeal exudate. Eyes: Conjunctivae and EOM are normal. Pupils are equal, round, and reactive to light. Right eye exhibits no discharge. Left eye exhibits no discharge. No scleral icterus. Neck: Normal range of motion. Neck supple. No JVD present. No tracheal deviation present. Cardiovascular: Regular rhythm, normal heart sounds and intact distal pulses. Tachycardia present. Exam reveals no gallop and no friction rub. No murmur heard. Pulmonary/Chest: Effort normal and breath sounds normal. No stridor. No respiratory distress. She has no decreased breath sounds. She has no wheezes. She has no rhonchi. She has no rales. She exhibits tenderness (anterior chest wall tenderness). Abdominal: Soft. Bowel sounds are normal. She exhibits no distension. There is no tenderness. There is no rebound and no guarding. Musculoskeletal: Normal range of motion. She exhibits no edema or tenderness. Neurological: She is alert and oriented to person, place, and time. She has normal strength and normal reflexes. No cranial nerve deficit or sensory deficit. She exhibits normal muscle tone. Coordination normal. GCS eye subscore is 4. GCS verbal subscore is 5. GCS motor subscore is 6. Skin: Skin is warm and dry. No rash noted. She is not diaphoretic. No erythema. No pallor. Psychiatric: She has a normal mood and affect. Her behavior is normal. Judgment and thought content normal.   Nursing note and vitals reviewed.   Note written by Yovanny Gunn Antonella Ponce, as dictated by Sandra Lima, DO 9:02 PM      MDM  Number of Diagnoses or Management Options  Chest pain, unspecified type:   Dehydration:   Hyperglycemia:      Amount and/or Complexity of Data Reviewed  Clinical lab tests: ordered and reviewed  Tests in the radiology section of CPT®: ordered and reviewed  Tests in the medicine section of CPT®: ordered and reviewed  Review and summarize past medical records: yes  Independent visualization of images, tracings, or specimens: yes (ekg)    Risk of Complications, Morbidity, and/or Mortality  Presenting problems: moderate  Diagnostic procedures: moderate  Management options: moderate    Patient Progress  Patient progress: stable    ED Course       Procedures    PROGRESS NOTE:  1:24 AM   Reviewed results with patient. Will discharge home with PCP follow-up. Chief Complaint   Patient presents with    Chest Pain       2:38 AM  The patients presenting problems have been discussed, and they are in agreement with the care plan formulated and outlined with them. I have encouraged them to ask questions as they arise throughout their visit.     MEDICATIONS GIVEN:  Medications   sodium chloride (NS) flush 5-10 mL (10 mL IntraVENous Given 9/24/17 2108)   sodium chloride (NS) flush 5-10 mL (not administered)   sodium chloride 0.9 % bolus infusion 1,000 mL (0 mL IntraVENous IV Completed 9/24/17 2208)   morphine injection 2 mg (2 mg IntraVENous Given 9/24/17 2108)   ondansetron (ZOFRAN) injection 4 mg (4 mg IntraVENous Given 9/24/17 2108)   acetaminophen (TYLENOL) tablet 1,000 mg (1,000 mg Oral Given 9/24/17 2235)   sodium chloride 0.9 % bolus infusion 1,000 mL (0 mL IntraVENous IV Completed 9/24/17 2335)   iopamidol (ISOVUE-370) 76 % injection 100 mL (76 mL IntraVENous Given 9/25/17 0049)   iopamidol (ISOVUE-370) 76 % injection 100 mL (92 mL IntraVENous Given 9/25/17 0058)       LABS REVIEWED:  Recent Results (from the past 24 hour(s))   CBC WITH AUTOMATED DIFF    Collection Time: 09/24/17  9:01 PM   Result Value Ref Range    WBC 5.3 3.6 - 11.0 K/uL    RBC 4.24 3.80 - 5.20 M/uL    HGB 12.2 11.5 - 16.0 g/dL    HCT 36.4 35.0 - 47.0 %    MCV 85.8 80.0 - 99.0 FL    MCH 28.8 26.0 - 34.0 PG    MCHC 33.5 30.0 - 36.5 g/dL    RDW 13.3 11.5 - 14.5 %    PLATELET 942 399 - 059 K/uL    NEUTROPHILS 47 32 - 75 %    LYMPHOCYTES 46 12 - 49 %    MONOCYTES 6 5 - 13 %    EOSINOPHILS 1 0 - 7 %    BASOPHILS 0 0 - 1 %    ABS. NEUTROPHILS 2.5 1.8 - 8.0 K/UL    ABS. LYMPHOCYTES 2.5 0.8 - 3.5 K/UL    ABS. MONOCYTES 0.3 0.0 - 1.0 K/UL    ABS. EOSINOPHILS 0.1 0.0 - 0.4 K/UL    ABS. BASOPHILS 0.0 0.0 - 0.1 K/UL   TROPONIN I    Collection Time: 09/24/17  9:01 PM   Result Value Ref Range    Troponin-I, Qt. <0.04 <3.28 ng/mL   METABOLIC PANEL, COMPREHENSIVE    Collection Time: 09/24/17  9:01 PM   Result Value Ref Range    Sodium 143 136 - 145 mmol/L    Potassium 4.0 3.5 - 5.1 mmol/L    Chloride 106 97 - 108 mmol/L    CO2 24 21 - 32 mmol/L    Anion gap 13 5 - 15 mmol/L    Glucose 414 (H) 65 - 100 mg/dL    BUN 14 6 - 20 MG/DL    Creatinine 1.54 (H) 0.55 - 1.02 MG/DL    BUN/Creatinine ratio 9 (L) 12 - 20      GFR est AA 45 (L) >60 ml/min/1.73m2    GFR est non-AA 37 (L) >60 ml/min/1.73m2    Calcium 9.4 8.5 - 10.1 MG/DL    Bilirubin, total 0.1 (L) 0.2 - 1.0 MG/DL    ALT (SGPT) 23 12 - 78 U/L    AST (SGOT) 10 (L) 15 - 37 U/L    Alk.  phosphatase 115 45 - 117 U/L    Protein, total 7.1 6.4 - 8.2 g/dL    Albumin 3.4 (L) 3.5 - 5.0 g/dL    Globulin 3.7 2.0 - 4.0 g/dL    A-G Ratio 0.9 (L) 1.1 - 2.2     GLUCOSE, POC    Collection Time: 09/24/17 10:49 PM   Result Value Ref Range    Glucose (POC) 365 (H) 65 - 100 mg/dL    Performed by [facility]    TROPONIN I    Collection Time: 09/24/17 11:46 PM   Result Value Ref Range    Troponin-I, Qt. <0.04 <0.05 ng/mL   GLUCOSE, POC    Collection Time: 09/25/17  1:03 AM   Result Value Ref Range    Glucose (POC) 307 (H) 65 - 100 mg/dL    Performed by [facility]        VITAL SIGNS:  Patient Vitals for the past 12 hrs:   Temp Pulse Resp BP SpO2   09/25/17 0030 - (!) 111 16 120/77 95 %   09/25/17 0000 - (!) 107 16 113/82 96 %   09/24/17 2300 - (!) 110 15 96/55 94 %   09/24/17 2200 - (!) 119 18 120/85 95 %   09/24/17 2130 - - - 110/76 -   09/24/17 2100 - - - 119/79 97 %   09/24/17 2054 98 °F (36.7 °C) (!) 124 20 122/78 98 %       RADIOLOGY RESULTS:  The following have been ordered and reviewed:  CTA CHEST W OR W WO CONT   Final Result      XR CHEST PORT   Final Result        Study Result         INDICATION:  Chest Pain     COMPARISON:  None.     FINDINGS: A portable AP radiograph of the chest was obtained at 2059 hours. The  patient is on a cardiac monitor. The lungs are clear. The cardiac and  mediastinal contours and pulmonary vascularity are normal.  The bones and soft  tissues are grossly within normal limits.      IMPRESSION  IMPRESSION: Normal chest.     Study Result      History: Chest pain and shortness of breath.     CTA of the chest was performed. 168 mL Isovue-370 were injected and scanning was  performed during the arterial phase of contrast administration. Post processing  was performed. 3D reconstructions were performed. CT dose reduction was  achieved through use of a standardized protocol tailored for this examination  and automatic exposure control for dose modulation.       There are no intraluminal filling defects to suggest pulmonary embolism. The  heart, pericardium, and great vessels appear unremarkable. The chest wall and  axilla appear unremarkable. The lungs are clear.     IMPRESSION  IMPRESSION: No evidence for pulmonary embolism. ED EKG interpretation:  Rhythm: sinus tachycardia; and regular . Rate (approx.): 124; Axis: normal; P wave: normal; QRS interval: normal ; ST/T wave: normal; Other findings: normal. This EKG was interpreted by Aria Manning DO,ED Provider. PROGRESS NOTES:  CTA chest negative, second trop negative. Discussed results and plan with patient. Patient will be discharged home with PCP followup. Patient instructed to return to the emergency room for any worsening symptoms or any other concerns. DIAGNOSIS:    1. Chest pain, unspecified type    2. Hyperglycemia    3. Dehydration        PLAN:  Follow-up Information     Follow up With Details Comments Contact Info    NANCY Butcher Schedule an appointment as soon as possible for a visit  [address]      [facility] EMERGENCY DEPT  If symptoms worsen [address] [facility]  [phone]        Discharge Medication List as of 9/25/2017  1:25 AM      CONTINUE these medications which have NOT CHANGED    Details   nitroglycerin (NITROSTAT) 0.4 mg SL tablet 0.4 mg by SubLINGual route every five (5) minutes as needed for Chest Pain., Historical Med      gabapentin (NEURONTIN) 100 mg capsule Take 100 mg by mouth nightly., Historical Med      insulin glargine (LANTUS SOLOSTAR) 100 unit/mL (3 mL) pen 40 Units by SubCUTAneous route Daily (before breakfast). , Print, Disp-1 Each, R-2      insulin lispro (HUMALOG) 100 unit/mL injection 6 Units by SubCUTAneous route Before breakfast, lunch, and dinner., Print, Disp-1 Vial, R-2      lisinopril (PRINIVIL, ZESTRIL) 5 mg tablet Take 1 Tab by mouth daily. , Print, Disp-30 Tab, R-1      Insulin Syringe-Needle U-100 (INSULIN SYRINGE) 1 mL 29 gauge x 1/2\" syrg Use as directed, Print, Disp-100 Syringe, R-2      lancets (FREESTYLE LANCETS) 28 gauge misc Use as directed, Print, Disp-100 Lancet, R-2      glucose blood VI test strips (FREESTYLE TEST) strip Use as directed, Print, Disp-100 Strip, R-2      topiramate (TOPAMAX) 100 mg tablet Take 100 mg by mouth two (2) times a day., Historical Med      clonazePAM (KLONOPIN) 0.5 mg tablet Take 0.5 mg by mouth three (3) times daily.  Indications: anxiety, Historical Med      zolpidem (AMBIEN) 10 mg tablet Take 10 mg by mouth nightly as needed for Sleep., Historical Med      atorvastatin (LIPITOR) 40 mg tablet Take 40 mg by mouth daily. , Historical Med               ED COURSE: The patients hospital course has been uncomplicated.

## 2017-09-25 NOTE — DISCHARGE INSTRUCTIONS
We hope that we have addressed all of your medical concerns. The examination and treatment you received in the Emergency Department were for an emergent problem and were not intended as complete care. It is important that you follow up with your healthcare provider(s) for ongoing care. If your symptoms worsen or do not improve as expected, and you are unable to reach your usual health care provider(s), you should return to the Emergency Department. Today's healthcare is undergoing tremendous change, and patient satisfaction surveys are one of the many tools to assess the quality of medical care. You may receive a survey from the [facility] [facility] regarding your experience in the Emergency Department. I hope that your experience has been completely positive, particularly the medical care that I provided. As such, please participate in the survey; anything less than excellent does not meet my expectations or intentions. [facility] and [employer] participate in nationally recognized quality of care measures. If your blood pressure is greater than 120/80, as reported below, we urge that you seek medical care to address the potential of high blood pressure, commonly known as hypertension. Hypertension can be hereditary or can be caused by certain medical conditions, pain, stress, or \"white coat syndrome. \"       Please make an appointment with your health care provider(s) for follow up of your Emergency Department visit. VITALS:   Patient Vitals for the past 8 hrs:   Temp Pulse Resp BP SpO2   09/25/17 0030 - (!) 111 16 120/77 95 %   09/25/17 0000 - (!) 107 16 113/82 96 %   09/24/17 2300 - (!) 110 15 96/55 94 %   09/24/17 2200 - (!) 119 18 120/85 95 %   09/24/17 2130 - - - 110/76 -   09/24/17 2100 - - - 119/79 97 %   09/24/17 2054 98 °F (36.7 °C) (!) 124 20 122/78 98 %          Thank you for allowing us to provide you with medical care today.   We realize that you have many choices for your emergency care needs. Please choose us in the future for any continued health care needs. [facility] [place] [address], [address] [place] [address].   Office: [phone]            Recent Results (from the past 24 hour(s))   CBC WITH AUTOMATED DIFF    Collection Time: 09/24/17  9:01 PM   Result Value Ref Range    WBC 5.3 3.6 - 11.0 K/uL    RBC 4.24 3.80 - 5.20 M/uL    HGB 12.2 11.5 - 16.0 g/dL    HCT 36.4 35.0 - 47.0 %    MCV 85.8 80.0 - 99.0 FL    MCH 28.8 26.0 - 34.0 PG    MCHC 33.5 30.0 - 36.5 g/dL    RDW 13.3 11.5 - 14.5 %    PLATELET 564 883 - 818 K/uL    NEUTROPHILS 47 32 - 75 %    LYMPHOCYTES 46 12 - 49 %    MONOCYTES 6 5 - 13 %    EOSINOPHILS 1 0 - 7 %    BASOPHILS 0 0 - 1 %    ABS. NEUTROPHILS 2.5 1.8 - 8.0 K/UL    ABS. LYMPHOCYTES 2.5 0.8 - 3.5 K/UL    ABS. MONOCYTES 0.3 0.0 - 1.0 K/UL    ABS. EOSINOPHILS 0.1 0.0 - 0.4 K/UL    ABS. BASOPHILS 0.0 0.0 - 0.1 K/UL   TROPONIN I    Collection Time: 09/24/17  9:01 PM   Result Value Ref Range    Troponin-I, Qt. <0.04 <4.95 ng/mL   METABOLIC PANEL, COMPREHENSIVE    Collection Time: 09/24/17  9:01 PM   Result Value Ref Range    Sodium 143 136 - 145 mmol/L    Potassium 4.0 3.5 - 5.1 mmol/L    Chloride 106 97 - 108 mmol/L    CO2 24 21 - 32 mmol/L    Anion gap 13 5 - 15 mmol/L    Glucose 414 (H) 65 - 100 mg/dL    BUN 14 6 - 20 MG/DL    Creatinine 1.54 (H) 0.55 - 1.02 MG/DL    BUN/Creatinine ratio 9 (L) 12 - 20      GFR est AA 45 (L) >60 ml/min/1.73m2    GFR est non-AA 37 (L) >60 ml/min/1.73m2    Calcium 9.4 8.5 - 10.1 MG/DL    Bilirubin, total 0.1 (L) 0.2 - 1.0 MG/DL    ALT (SGPT) 23 12 - 78 U/L    AST (SGOT) 10 (L) 15 - 37 U/L    Alk.  phosphatase 115 45 - 117 U/L    Protein, total 7.1 6.4 - 8.2 g/dL    Albumin 3.4 (L) 3.5 - 5.0 g/dL    Globulin 3.7 2.0 - 4.0 g/dL    A-G Ratio 0.9 (L) 1.1 - 2.2     GLUCOSE, POC    Collection Time: 09/24/17 10:49 PM   Result Value Ref Range    Glucose (POC) 365 (H) 65 - 100 mg/dL    Performed by Charlie Shallow    TROPONIN I    Collection Time: 09/24/17 11:46 PM   Result Value Ref Range    Troponin-I, Qt. <0.04 <0.05 ng/mL   GLUCOSE, POC    Collection Time: 09/25/17  1:03 AM   Result Value Ref Range    Glucose (POC) 307 (H) 65 - 100 mg/dL    Performed by Charlie Shallow        Cta Chest W Or W Wo Cont    Result Date: 9/25/2017  History: Chest pain and shortness of breath. CTA of the chest was performed. 168 mL Isovue-370 were injected and scanning was performed during the arterial phase of contrast administration. Post processing was performed. 3D reconstructions were performed. CT dose reduction was achieved through use of a standardized protocol tailored for this examination and automatic exposure control for dose modulation. There are no intraluminal filling defects to suggest pulmonary embolism. The heart, pericardium, and great vessels appear unremarkable. The chest wall and axilla appear unremarkable. The lungs are clear. IMPRESSION: No evidence for pulmonary embolism. Xr Chest Port    Result Date: 9/24/2017  INDICATION:  Chest Pain COMPARISON:  None. FINDINGS: A portable AP radiograph of the chest was obtained at 2059 hours. The patient is on a cardiac monitor. The lungs are clear. The cardiac and mediastinal contours and pulmonary vascularity are normal.  The bones and soft tissues are grossly within normal limits. IMPRESSION: Normal chest.              Chest Pain: Care Instructions  Your Care Instructions  There are many things that can cause chest pain. Some are not serious and will get better on their own in a few days. But some kinds of chest pain need more testing and treatment. Your doctor may have recommended a follow-up visit in the next 8 to 12 hours. If you are not getting better, you may need more tests or treatment. Even though your doctor has released you, you still need to watch for any problems.  The doctor carefully checked you, but sometimes problems can develop later. If you have new symptoms or if your symptoms do not get better, get medical care right away. If you have worse or different chest pain or pressure that lasts more than 5 minutes or you passed out (lost consciousness), call 911 or seek other emergency help right away. A medical visit is only one step in your treatment. Even if you feel better, you still need to do what your doctor recommends, such as going to all suggested follow-up appointments and taking medicines exactly as directed. This will help you recover and help prevent future problems. How can you care for yourself at home? · Rest until you feel better. · Take your medicine exactly as prescribed. Call your doctor if you think you are having a problem with your medicine. · Do not drive after taking a prescription pain medicine. When should you call for help? Call 911 if:  · You passed out (lost consciousness). · You have severe difficulty breathing. · You have symptoms of a heart attack. These may include:  ¨ Chest pain or pressure, or a strange feeling in your chest.  ¨ Sweating. ¨ Shortness of breath. ¨ Nausea or vomiting. ¨ Pain, pressure, or a strange feeling in your back, neck, jaw, or upper belly or in one or both shoulders or arms. ¨ Lightheadedness or sudden weakness. ¨ A fast or irregular heartbeat. After you call 911, the  may tell you to chew 1 adult-strength or 2 to 4 low-dose aspirin. Wait for an ambulance. Do not try to drive yourself. Call your doctor today if:  · You have any trouble breathing. · Your chest pain gets worse. · You are dizzy or lightheaded, or you feel like you may faint. · You are not getting better as expected. · You are having new or different chest pain. Where can you learn more? Go to http://adamaris-liana.info/. Enter A120 in the search box to learn more about \"Chest Pain: Care Instructions. \"  Current as of: March 20, 2017  Content Version: 11.3  © 3050-2262 [employer]. Care instructions adapted under license by [employer] (which disclaims liability or warranty for this information). If you have questions about a medical condition or this instruction, always ask your healthcare professional. [address] any warranty or liability for your use of this information. Dehydration: Care Instructions  Your Care Instructions  Dehydration happens when your body loses too much fluid. This might happen when you do not drink enough water or you lose large amounts of fluids from your body because of diarrhea, vomiting, or sweating. Severe dehydration can be life-threatening. Water and minerals called electrolytes help put your body fluids back in balance. Learn the early signs of fluid loss, and drink more fluids to prevent dehydration. Follow-up care is a key part of your treatment and safety. Be sure to make and go to all appointments, and call your doctor if you are having problems. It's also a good idea to know your test results and keep a list of the medicines you take. How can you care for yourself at home? · To prevent dehydration, drink plenty of fluids, enough so that your urine is light yellow or clear like water. Choose water and other caffeine-free clear liquids until you feel better. If you have kidney, heart, or liver disease and have to limit fluids, talk with your doctor before you increase the amount of fluids you drink. · If you do not feel like eating or drinking, try taking small sips of water, sports drinks, or other rehydration drinks. · Get plenty of rest.  To prevent dehydration  · Add more fluids to your diet and daily routine, unless your doctor has told you not to. · During hot weather, drink more fluids. Drink even more fluids if you exercise a lot. Stay away from drinks with alcohol or caffeine. · Watch for the symptoms of dehydration.  These include:  ¨ A dry, sticky mouth. ¨ Dark yellow urine, and not much of it. ¨ Dry and sunken eyes. ¨ Feeling very tired. · Learn what problems can lead to dehydration. These include:  ¨ Diarrhea, fever, and vomiting. ¨ Any illness with a fever, such as pneumonia or the flu. ¨ Activities that cause heavy sweating, such as endurance races and heavy outdoor work in hot or humid weather. ¨ Alcohol or drug abuse or withdrawal.  ¨ Certain medicines, such as cold and allergy pills (antihistamines), diet pills (diuretics), and laxatives. ¨ Certain diseases, such as diabetes, cancer, and heart or kidney disease. When should you call for help? Call 911 anytime you think you may need emergency care. For example, call if:  · You passed out (lost consciousness). Call your doctor now or seek immediate medical care if:  · You are confused and cannot think clearly. · You are dizzy or lightheaded, or you feel like you may faint. · You have signs of needing more fluids. You have sunken eyes and a dry mouth, and you pass only a little dark urine. · You cannot keep fluids down. Watch closely for changes in your health, and be sure to contact your doctor if:  · You are not making tears. · Your skin is very dry and sags slowly back into place after you pinch it. · Your mouth and eyes are very dry. Where can you learn more? Go to http://adamaris-liana.info/. Enter H162 in the search box to learn more about \"Dehydration: Care Instructions. \"  Current as of: March 20, 2017  Content Version: 11.3  © 9424-5956 [employer]. Care instructions adapted under license by [employer] (which disclaims liability or warranty for this information). If you have questions about a medical condition or this instruction, always ask your healthcare professional. [place] [address] any warranty or liability for your use of this information.          Learning About High Blood Sugar  What is high blood sugar?  Your body turns the food you eat into glucose (sugar), which it uses for energy. But if your body isn't able to use the sugar right away, it can build up in your blood and lead to high blood sugar. When the amount of sugar in your blood stays too high for too much of the time, you may have diabetes. Diabetes is a disease that can cause serious health problems. The good news is that lifestyle changes may help you get your blood sugar back to normal and avoid or delay diabetes. What causes high blood sugar? Sugar (glucose) can build up in your blood if you:  · Are overweight. · Have a family history of diabetes. · Take certain medicines, such as steroids. What are the symptoms? Having high blood sugar may not cause any symptoms at all. Or it may make you feel very thirsty or very hungry. You may also urinate more often than usual, have blurry vision, or lose weight without trying. How is high blood sugar treated? You can take steps to lower your blood sugar level if you understand what makes it get higher. Your doctor may want you to learn how to test your blood sugar level at home. Then you can see how illness, stress, or different kinds of food or medicine raise or lower your blood sugar level. Other tests may be needed to see if you have diabetes. How can you prevent high blood sugar? · Watch your weight. If you're overweight, losing just a small amount of weight may help. Reducing fat around your waist is most important. · Limit the amount of calories, sweets, and unhealthy fat you eat. Ask your doctor if a dietitian can help you. A registered dietitian can help you create meal plans that fit your lifestyle. · Get at least 30 minutes of exercise on most days of the week. Exercise helps control your blood sugar. It also helps you maintain a healthy weight. Walking is a good choice.  You also may want to do other activities, such as running, swimming, cycling, or playing tennis or team sports. · If your doctor prescribed medicines, take them exactly as prescribed. Call your doctor if you think you are having a problem with your medicine. You will get more details on the specific medicines your doctor prescribes. Follow-up care is a key part of your treatment and safety. Be sure to make and go to all appointments, and call your doctor if you are having problems. It's also a good idea to know your test results and keep a list of the medicines you take. Where can you learn more? Go to http://adamaris-liana.info/. Enter O108 in the search box to learn more about \"Learning About High Blood Sugar. \"  Current as of: March 13, 2017  Content Version: 11.3  © 0113-4643 [employer]. Care instructions adapted under license by [employer] (which disclaims liability or warranty for this information). If you have questions about a medical condition or this instruction, always ask your healthcare professional. [place] [address] any warranty or liability for your use of this information.

## 2017-09-25 NOTE — ED NOTES
Bedside and Verbal shift change report given to Leticia Hebert (oncoming nurse) by Tyson Desai (offgoing nurse). Report included the following information SBAR, Kardex, ED Summary and MAR.

## 2017-12-27 ENCOUNTER — HOSPITAL ENCOUNTER (EMERGENCY)
Age: 42
Discharge: HOME | End: 2017-12-27
Attending: EMERGENCY MEDICINE
Payer: SELF-PAY

## 2017-12-27 VITALS
SYSTOLIC BLOOD PRESSURE: 152 MMHG | RESPIRATION RATE: 16 BRPM | WEIGHT: 205 LBS | HEART RATE: 89 BPM | HEIGHT: 67 IN | DIASTOLIC BLOOD PRESSURE: 98 MMHG | OXYGEN SATURATION: 98 % | BODY MASS INDEX: 32.18 KG/M2 | TEMPERATURE: 98 F

## 2017-12-27 DIAGNOSIS — M75.51 SUBACROMIAL BURSITIS OF RIGHT SHOULDER JOINT: Primary | ICD-10-CM

## 2017-12-27 DIAGNOSIS — S46.811A TRAPEZIUS MUSCLE STRAIN, RIGHT, INITIAL ENCOUNTER: ICD-10-CM

## 2017-12-27 PROCEDURE — 99283 EMERGENCY DEPT VISIT LOW MDM: CPT

## 2017-12-27 PROCEDURE — 74011250637 HC RX REV CODE- 250/637: Performed by: PHYSICIAN ASSISTANT

## 2017-12-27 PROCEDURE — 74011636637 HC RX REV CODE- 636/637: Performed by: PHYSICIAN ASSISTANT

## 2017-12-27 PROCEDURE — A4565 SLINGS: HCPCS

## 2017-12-27 RX ORDER — PREDNISONE 10 MG/1
TABLET ORAL
Qty: 21 TAB | Refills: 0 | Status: SHIPPED | OUTPATIENT
Start: 2017-12-27 | End: 2018-03-06

## 2017-12-27 RX ORDER — DIAZEPAM 5 MG/1
5 TABLET ORAL
Qty: 6 TAB | Refills: 0 | Status: SHIPPED | OUTPATIENT
Start: 2017-12-27 | End: 2018-08-22

## 2017-12-27 RX ORDER — PREDNISONE 20 MG/1
60 TABLET ORAL
Status: COMPLETED | OUTPATIENT
Start: 2017-12-27 | End: 2017-12-27

## 2017-12-27 RX ORDER — DIAZEPAM 5 MG/1
5 TABLET ORAL
Status: COMPLETED | OUTPATIENT
Start: 2017-12-27 | End: 2017-12-27

## 2017-12-27 RX ADMIN — PREDNISONE 60 MG: 20 TABLET ORAL at 12:33

## 2017-12-27 RX ADMIN — DIAZEPAM 5 MG: 5 TABLET ORAL at 12:33

## 2017-12-27 NOTE — DISCHARGE INSTRUCTIONS
Shoulder Bursitis: Care Instructions  Your Care Instructions    Bursitis is inflammation of the bursa. A bursa is a small sac of fluid that cushions a joint and helps it move easily. A bursa sits under the highest point of your shoulder. You can get bursitis by overusing your shoulder, which can happen with activities such as lifting, pitching a ball, or painting. Symptoms of bursitis include pain when you move your arm. Your arm may hurt when you try to lift it, and the pain can reach down the side of your arm. You may have trouble sleeping because of the pain. Bursitis usually gets better if you avoid the activity that caused it. If pain lasts or gets worse despite home treatment, your doctor may draw fluid from the bursa through a needle. This may relieve your pain and help your doctor know if you have an infection. If so, your doctor will prescribe antibiotics. If you have inflammation only, you may get a corticosteroid shot to reduce swelling and pain. Sometimes surgery is needed to drain or remove the bursa. Follow-up care is a key part of your treatment and safety. Be sure to make and go to all appointments, and call your doctor if you are having problems. It's also a good idea to know your test results and keep a list of the medicines you take. How can you care for yourself at home? · Put ice or a cold pack on your shoulder for 10 to 20 minutes at a time. Put a thin cloth between the ice and your skin. · After 3 days of using ice, use heat on your shoulder. You can use a hot water bottle, a heating pad set on low, or a warm, moist towel. Some doctors suggest alternating between hot and cold. · Rest your shoulder. Stop any activities that cause pain. Switch to activities that do not stress your shoulder. · Take your medicines exactly as prescribed. Call your doctor if you think you are having a problem with your medicine.   · If your doctor recommends it, take anti-inflammatory medicines to reduce pain. These include ibuprofen (Advil, Motrin) and naproxen (Aleve). Read and follow all instructions on the label. · To prevent stiffness, gently move the shoulder joint through its full range of motion. As the pain gets better, keep doing range-of-motion exercises. Ask your doctor for exercises that will make the muscles around the shoulder joint stronger. Do these as directed. · You can slowly return to the activity that caused the pain, but do it with less effort until you can do it without pain or swelling. Be sure to warm up before and stretch after you do the activity. When should you call for help? Call your doctor now or seek immediate medical care if:  ? · You have a fever. ? · You have increased swelling or redness in your shoulder. ? · You cannot use your shoulder, or the pain in your shoulder gets worse. ? Watch closely for changes in your health, and be sure to contact your doctor if:  ? · You have pain for 2 weeks or longer despite home treatment. Where can you learn more? Go to http://adamaris-liana.info/. Enter M955 in the search box to learn more about \"Shoulder Bursitis: Care Instructions. \"  Current as of: March 21, 2017  Content Version: 11.4  © 5325-2317 [employer]. Care instructions adapted under license by [employer] (which disclaims liability or warranty for this information). If you have questions about a medical condition or this instruction, always ask your healthcare professional. [place] [address] any warranty or liability for your use of this information. Shoulder Bursitis: Exercises  Your Care Instructions  Here are some examples of typical rehabilitation exercises for your condition. Start each exercise slowly. Ease off the exercise if you start to have pain. Your doctor or physical therapist will tell you when you can start these exercises and which ones will work best for you.   How to do the exercises  Posterior stretching exercise    1. Hold the elbow of your injured arm with your other hand. 2. Use your hand to pull your injured arm gently up and across your body. You will feel a gentle stretch across the back of your injured shoulder. 3. Hold for at least 15 to 30 seconds. Then slowly lower your arm. 4. Repeat 2 to 4 times. Up-the-back stretch    Your doctor or physical therapist may want you to wait to do this stretch until you have regained most of your range of motion and strength. You can do this stretch in different ways. Hold any of these stretches for at least 15 to 30 seconds. Repeat them 2 to 4 times. 1. Put your hand in your back pocket. Let it rest there to stretch your shoulder. 2. With your other hand, hold your injured arm (palm outward) behind your back by the wrist. Pull your arm up gently to stretch your shoulder. 3. Next, put a towel over your other shoulder. Put the hand of your injured arm behind your back. Now hold the back end of the towel. With the other hand, hold the front end of the towel in front of your body. Pull gently on the front end of the towel. This will bring your hand farther up your back to stretch your shoulder. Overhead stretch    1. Standing about an arm's length away, grasp onto a solid surface. You could use a countertop, a doorknob, or the back of a sturdy chair. 2. With your knees slightly bent, bend forward with your arms straight. Lower your upper body, and let your shoulders stretch. 3. As your shoulders are able to stretch farther, you may need to take a step or two backward. 4. Hold for at least 15 to 30 seconds. Then stand up and relax. If you had stepped back during your stretch, step forward so you can keep your hands on the solid surface. 5. Repeat 2 to 4 times. Shoulder flexion (lying down)    To make a wand for this exercise, use a piece of PVC pipe or a broom handle with the broom removed.  Make the wand about a foot wider than your shoulders. 1. Lie on your back, holding a wand with both hands. Your palms should face down as you hold the wand. 2. Keeping your elbows straight, slowly raise your arms over your head. Raise them until you feel a stretch in your shoulders, upper back, and chest.  3. Hold for 15 to 30 seconds. 4. Repeat 2 to 4 times. Shoulder rotation (lying down)    To make a wand for this exercise, use a piece of PVC pipe or a broom handle with the broom removed. Make the wand about a foot wider than your shoulders. 1. Lie on your back. Hold a wand with both hands with your elbows bent and palms up. 2. Keep your elbows close to your body, and move the wand across your body toward the sore arm. 3. Hold for 8 to 12 seconds. 4. Repeat 2 to 4 times. Shoulder blade squeeze    1. Stand with your arms at your sides, and squeeze your shoulder blades together. Do not raise your shoulders up as you squeeze. 2. Hold 6 seconds. 3. Repeat 8 to 12 times. Shoulder flexor and extensor exercise    These are isometric exercises. That means you contract your muscles without actually moving. 1. Push forward (flex): Stand facing a wall or doorjamb, about 6 inches or less back. Hold your injured arm against your body. Make a closed fist with your thumb on top. Then gently push your hand forward into the wall with about 25% to 50% of your strength. Don't let your body move backward as you push. Hold for about 6 seconds. Relax for a few seconds. Repeat 8 to 12 times. 2. Push backward (extend): Stand with your back flat against a wall. Your upper arm should be against the wall, with your elbow bent 90 degrees (your hand straight ahead). Push your elbow gently back against the wall with about 25% to 50% of your strength. Don't let your body move forward as you push. Hold for about 6 seconds. Relax for a few seconds. Repeat 8 to 12 times.   Scapular exercise: Wall push-ups    This exercise is best done with your fingers somewhat turned out, rather than straight up and down. 1. Stand facing a wall, about 12 inches to 18 inches away. 2. Place your hands on the wall at shoulder height. 3. Slowly bend your elbows and bring your face to the wall. Keep your back and hips straight. 4. Push back to where you started. 5. Repeat 8 to 12 times. 6. When you can do this exercise against a wall comfortably, you can try it against a counter. You can then slowly progress to the end of a couch, then to a sturdy chair, and finally to the floor. Scapular exercise: Retraction    For this exercise, you will need elastic exercise material, such as surgical tubing or Thera-Band. 1. Put the band around a solid object at about waist level. (A bedpost will work well.) Each hand should hold an end of the band. 2. With your elbows at your sides and bent to 90 degrees, pull the band back. Your shoulder blades should move toward each other. Then move your arms back where you started. 3. Repeat 8 to 12 times. 4. If you have good range of motion in your shoulders, try this exercise with your arms lifted out to the sides. Keep your elbows at a 90-degree angle. Raise the elastic band up to about shoulder level. Pull the band back to move your shoulder blades toward each other. Then move your arms back where you started. Internal rotator strengthening exercise    1. Start by tying a piece of elastic exercise material to a doorknob. You can use surgical tubing or Thera-Band. 2. Stand or sit with your shoulder relaxed and your elbow bent 90 degrees. Your upper arm should rest comfortably against your side. Squeeze a rolled towel between your elbow and your body for comfort. This will help keep your arm at your side. 3. Hold one end of the elastic band in the hand of the painful arm. 4. Slowly rotate your forearm toward your body until it touches your belly. Slowly move it back to where you started.   5. Keep your elbow and upper arm firmly tucked against the towel roll or at your side. 6. Repeat 8 to 12 times. External rotator strengthening exercise    1. Start by tying a piece of elastic exercise material to a doorknob. You can use surgical tubing or Thera-Band. (You may also hold one end of the band in each hand.)  2. Stand or sit with your shoulder relaxed and your elbow bent 90 degrees. Your upper arm should rest comfortably against your side. Squeeze a rolled towel between your elbow and your body for comfort. This will help keep your arm at your side. 3. Hold one end of the elastic band with the hand of the painful arm. 4. Start with your forearm across your belly. Slowly rotate the forearm out away from your body. Keep your elbow and upper arm tucked against the towel roll or the side of your body until you begin to feel tightness in your shoulder. Slowly move your arm back to where you started. 5. Repeat 8 to 12 times. Follow-up care is a key part of your treatment and safety. Be sure to make and go to all appointments, and call your doctor if you are having problems. It's also a good idea to know your test results and keep a list of the medicines you take. Where can you learn more? Go to http://adamaris-liana.info/. Enter S340 in the search box to learn more about \"Shoulder Bursitis: Exercises. \"  Current as of: March 21, 2017  Content Version: 11.4  © 0421-7403 [employer]. Care instructions adapted under license by "[employer] (which disclaims liability or warranty for this information). If you have questions about a medical condition or this instruction, always ask your healthcare professional. [place] [address] any warranty or liability for your use of this information.
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
## 2018-03-06 ENCOUNTER — APPOINTMENT (OUTPATIENT)
Dept: ULTRASOUND IMAGING | Age: 43
End: 2018-03-06
Attending: PHYSICIAN ASSISTANT
Payer: MEDICAID

## 2018-03-06 ENCOUNTER — HOSPITAL ENCOUNTER (EMERGENCY)
Age: 43
Discharge: HOME | End: 2018-03-06
Attending: EMERGENCY MEDICINE
Payer: MEDICAID

## 2018-03-06 VITALS
BODY MASS INDEX: 35.08 KG/M2 | WEIGHT: 223.99 LBS | HEART RATE: 120 BPM | RESPIRATION RATE: 16 BRPM | SYSTOLIC BLOOD PRESSURE: 130 MMHG | TEMPERATURE: 98 F | DIASTOLIC BLOOD PRESSURE: 80 MMHG | OXYGEN SATURATION: 99 %

## 2018-03-06 DIAGNOSIS — S60.420A BLISTER (NONTHERMAL) OF RIGHT INDEX FINGER, INITIAL ENCOUNTER: ICD-10-CM

## 2018-03-06 DIAGNOSIS — M79.604 LEG PAIN, RIGHT: ICD-10-CM

## 2018-03-06 DIAGNOSIS — M79.89 RIGHT LEG SWELLING: ICD-10-CM

## 2018-03-06 DIAGNOSIS — R73.9 HYPERGLYCEMIA: Primary | ICD-10-CM

## 2018-03-06 LAB
ALBUMIN SERPL-MCNC: 3.2 G/DL (ref 3.5–5)
ALBUMIN/GLOB SERPL: 1 {RATIO} (ref 1.1–2.2)
ALP SERPL-CCNC: 123 U/L (ref 45–117)
ALT SERPL-CCNC: 30 U/L (ref 12–78)
ANION GAP SERPL CALC-SCNC: 7 MMOL/L (ref 5–15)
AST SERPL-CCNC: 27 U/L (ref 15–37)
BASOPHILS # BLD: 0 K/UL (ref 0–0.1)
BASOPHILS NFR BLD: 0 % (ref 0–1)
BILIRUB SERPL-MCNC: 0.2 MG/DL (ref 0.2–1)
BUN SERPL-MCNC: 12 MG/DL (ref 6–20)
BUN/CREAT SERPL: 12 (ref 12–20)
CALCIUM SERPL-MCNC: 8.9 MG/DL (ref 8.5–10.1)
CHLORIDE SERPL-SCNC: 104 MMOL/L (ref 97–108)
CO2 SERPL-SCNC: 29 MMOL/L (ref 21–32)
CREAT SERPL-MCNC: 0.98 MG/DL (ref 0.55–1.02)
DIFFERENTIAL METHOD BLD: ABNORMAL
EOSINOPHIL # BLD: 0.1 K/UL (ref 0–0.4)
EOSINOPHIL NFR BLD: 1 % (ref 0–7)
ERYTHROCYTE [DISTWIDTH] IN BLOOD BY AUTOMATED COUNT: 12.8 % (ref 11.5–14.5)
GLOBULIN SER CALC-MCNC: 3.2 G/DL (ref 2–4)
GLUCOSE BLD STRIP.AUTO-MCNC: 272 MG/DL (ref 65–100)
GLUCOSE SERPL-MCNC: 356 MG/DL (ref 65–100)
HCT VFR BLD AUTO: 34.6 % (ref 35–47)
HGB BLD-MCNC: 11.5 G/DL (ref 11.5–16)
LYMPHOCYTES # BLD: 1.4 K/UL (ref 0.8–3.5)
LYMPHOCYTES NFR BLD: 27 % (ref 12–49)
MCH RBC QN AUTO: 28.8 PG (ref 26–34)
MCHC RBC AUTO-ENTMCNC: 33.2 G/DL (ref 30–36.5)
MCV RBC AUTO: 86.7 FL (ref 80–99)
MONOCYTES # BLD: 0.4 K/UL (ref 0–1)
MONOCYTES NFR BLD: 8 % (ref 5–13)
NEUTS SEG # BLD: 3.1 K/UL (ref 1.8–8)
NEUTS SEG NFR BLD: 64 % (ref 32–75)
PLATELET # BLD AUTO: 207 K/UL (ref 150–400)
PMV BLD AUTO: 11.3 FL (ref 8.9–12.9)
POTASSIUM SERPL-SCNC: 4.4 MMOL/L (ref 3.5–5.1)
PROT SERPL-MCNC: 6.4 G/DL (ref 6.4–8.2)
RBC # BLD AUTO: 3.99 M/UL (ref 3.8–5.2)
RBC MORPH BLD: ABNORMAL
SERVICE CMNT-IMP: ABNORMAL
SODIUM SERPL-SCNC: 140 MMOL/L (ref 136–145)
WBC # BLD AUTO: 5 K/UL (ref 3.6–11)
XXWBCSUS: 1

## 2018-03-06 PROCEDURE — 93971 EXTREMITY STUDY: CPT

## 2018-03-06 PROCEDURE — 75810000289 HC I&D ABSCESS SIMP/COMP/MULT

## 2018-03-06 PROCEDURE — 85025 COMPLETE CBC W/AUTO DIFF WBC: CPT | Performed by: PHYSICIAN ASSISTANT

## 2018-03-06 PROCEDURE — 82962 GLUCOSE BLOOD TEST: CPT

## 2018-03-06 PROCEDURE — 96360 HYDRATION IV INFUSION INIT: CPT

## 2018-03-06 PROCEDURE — 74011000250 HC RX REV CODE- 250: Performed by: PHYSICIAN ASSISTANT

## 2018-03-06 PROCEDURE — 99284 EMERGENCY DEPT VISIT MOD MDM: CPT

## 2018-03-06 PROCEDURE — 36415 COLL VENOUS BLD VENIPUNCTURE: CPT | Performed by: PHYSICIAN ASSISTANT

## 2018-03-06 PROCEDURE — 74011250636 HC RX REV CODE- 250/636: Performed by: PHYSICIAN ASSISTANT

## 2018-03-06 PROCEDURE — 80053 COMPREHEN METABOLIC PANEL: CPT | Performed by: PHYSICIAN ASSISTANT

## 2018-03-06 RX ORDER — CEPHALEXIN 500 MG/1
500 CAPSULE ORAL 3 TIMES DAILY
Qty: 15 CAP | Refills: 0 | Status: SHIPPED | OUTPATIENT
Start: 2018-03-06 | End: 2018-08-22

## 2018-03-06 RX ORDER — GABAPENTIN 300 MG/1
300 CAPSULE ORAL 3 TIMES DAILY
Qty: 15 CAP | Refills: 0 | Status: SHIPPED | OUTPATIENT
Start: 2018-03-06 | End: 2018-09-20

## 2018-03-06 RX ORDER — BUPIVACAINE HYDROCHLORIDE 5 MG/ML
5 INJECTION, SOLUTION EPIDURAL; INTRACAUDAL ONCE
Status: COMPLETED | OUTPATIENT
Start: 2018-03-06 | End: 2018-03-06

## 2018-03-06 RX ADMIN — SODIUM CHLORIDE 1000 ML: 900 INJECTION, SOLUTION INTRAVENOUS at 14:50

## 2018-03-06 RX ADMIN — BUPIVACAINE HYDROCHLORIDE 25 MG: 5 INJECTION, SOLUTION EPIDURAL; INTRACAUDAL at 14:43

## 2018-03-06 NOTE — ED NOTES
Blood drawn as ordered unable to obtain Periferal IV line at this time as catheter would not advance. Emil CRUZ made aware. Pt to U/S via stretcher accompanied by tech.

## 2018-03-06 NOTE — ED TRIAGE NOTES
Pt reports bilateral hand and feet swelling for the past 4 days. Pt states that the hands burn, sting and are painful.

## 2018-03-06 NOTE — ED PROVIDER NOTES
HPI Comments: Shar Bruno is a 38 y.o. female who presents ambulatory to the ED with a c/o bilateral hand and leg pain with right second finger swelling and right leg swelling x 4 days. Pt notes a burning sensation to her extremities except her finger which is aching. She has a long hx of diabetes and complications as a result. She denies injury. Pt denies cp, sob, n/v/d abd pain or urinary sx. She is right hand dominant. She notes she is not sure the last time she checked her blood sugar. Pt has a hx of right knee surgery years ago, but no recent procedures, recent travel or prolonged sitting. She denies taking hormones. Pt states her sx are worse with movement. She states she has tried taking tylenol without relief of her sx    PCP: Brad Glass NP  PMHx significant for: Past Medical History:  No date: Anxiety  No date: Arthritis  No date: Autoimmune disease ([facility]  No date: Bronchitis  No date: CAD (coronary artery disease)  No date: Chronic pain      Comment: fibromyalgia  No date: Diabetes ([facility]  No date: Fibromyalgia  No date: GERD (gastroesophageal reflux disease)  No date: H/O pericarditis  No date: High cholesterol  No date: History of continuous positive airway pressure*  No date: Hypertension  No date: Irregular heart beat  No date: Morbid obesity ([facility]  No date: Other ill-defined conditions(799.89)      Comment: sleep apnea  No date: Thromboembolus ([facility]      Comment: 2010 Leg DVT  No date: Unspecified sleep apnea      Comment: cpap  PSHx significant for: Past Surgical History:  2009: HX HEENT      Comment: tonsillectomy  2010: HX ORTHOPAEDIC      Comment: right knee patellar surgery with hardware  2005: HX IRVING AND BSO      Comment: hysterectomy  Social Hx: Tobacco: denies EtOH: denies Illicit drug use: denies    There are no further complaints or symptoms at this time. The history is provided by the patient.         Past Medical History:   Diagnosis Date    Anxiety     Arthritis     [facility]     Bronchitis     CAD (coronary artery disease)     Chronic pain     fibromyalgia    Diabetes ([facility]     Fibromyalgia     GERD (gastroesophageal reflux disease)     H/O pericarditis     High cholesterol     History of continuous positive airway pressure (CPAP) therapy     Hypertension     Irregular heart beat     Morbid obesity ([facility]     Other ill-defined conditions(799.89)     sleep apnea    Thromboembolus ([facility]     2010 Leg DVT    Unspecified sleep apnea     cpap       Past Surgical History:   Procedure Laterality Date    HX HEENT  2009    tonsillectomy    HX ORTHOPAEDIC  2010    right knee patellar surgery with hardware    HX IRVING AND BSO  2005    hysterectomy         Family History:   Problem Relation Age of Onset    Diabetes Mother     Hypertension Mother     Heart Disease Mother     Diabetes Father     Hypertension Father     Heart Disease Father     Diabetes Sister     Diabetes Sister     Hypertension Sister        Social History     Social History    Marital status:      Spouse name: N/A    Number of children: N/A    Years of education: N/A     Occupational History    Not on file. Social History Main Topics    Smoking status: Never Smoker    Smokeless tobacco: Never Used    Alcohol use No    Drug use: No    Sexual activity: Not Currently     Other Topics Concern    Not on file     Social History Narrative         ALLERGIES: Aspirin; Nsaids (non-steroidal anti-inflammatory drug); and Tramadol    Review of Systems   Constitutional: Negative for chills and fever. HENT: Negative for congestion, rhinorrhea, sneezing and sore throat. Eyes: Negative for redness and visual disturbance. Respiratory: Negative for shortness of breath. Cardiovascular: Positive for leg swelling. Negative for chest pain. Gastrointestinal: Negative for abdominal pain, nausea and vomiting. Genitourinary: Negative for difficulty urinating and frequency. Musculoskeletal: Positive for myalgias. Negative for back pain and neck stiffness. Skin: Negative for rash. Neurological: Negative for dizziness, syncope, weakness and headaches. Hematological: Negative for adenopathy. Patient Vitals for the past 12 hrs:   Temp Pulse Resp BP SpO2   03/06/18 1607 - - 16 130/80 99 %   03/06/18 1545 - - - 130/85 97 %   03/06/18 1512 - - 16 - 99 %   03/06/18 1509 - - - - 95 %   03/06/18 1506 - - - - 98 %   03/06/18 1246 98 °F (36.7 °C) (!) 120 18 (!) 132/91 96 %              Physical Exam   Constitutional: She is oriented to person, place, and time. She appears well-developed and well-nourished. No distress. HENT:   Head: Normocephalic and atraumatic. Right Ear: External ear normal.   Left Ear: External ear normal.   Nose: Nose normal.   Mouth/Throat: Oropharynx is clear and moist.   Eyes: EOM are normal. Pupils are equal, round, and reactive to light. Neck: Neck supple. Cardiovascular: Normal rate, regular rhythm, normal heart sounds and intact distal pulses. Exam reveals no gallop and no friction rub. No murmur heard. Pulmonary/Chest: Effort normal and breath sounds normal. No stridor. No respiratory distress. She has no wheezes. She has no rales. She exhibits no tenderness. Abdominal: Soft. Bowel sounds are normal. She exhibits no distension and no mass. There is no tenderness. There is no rebound and no guarding. Musculoskeletal: Normal range of motion. She exhibits edema and tenderness. She exhibits no deformity. Right second finger with swelling and blister appearing lesion to palmar surface of middle phalanx. With surrounding injection. ROM intact. Cap refill brisk. Normothermic. Right leg with slight swelling + diffuse ttp from toes to knee. No deformity or acute lesions ROM intact cap refill brisk. Normothermic. Well healed surgical incision to right knee. Ambulates without difficulty or assistance. Distal n/v intact.    No swelling, lesions, discoloration, deformity or limitations to ROM to left extremities. Cap refill brisk normothermic.distal n/v intact . Neurological: She is alert and oriented to person, place, and time. No cranial nerve deficit. Coordination normal.   Skin: No rash noted. No erythema. No pallor. Psychiatric: She has a normal mood and affect. Her behavior is normal.   Nursing note and vitals reviewed. MDM  Number of Diagnoses or Management Options  Blister (nonthermal) of right index finger, initial encounter:   Hyperglycemia:   Leg pain, right:   Right leg swelling:      Amount and/or Complexity of Data Reviewed  Clinical lab tests: reviewed and ordered  Tests in the radiology section of CPT®: ordered and reviewed  Tests in the medicine section of CPT®: ordered and reviewed  Obtain history from someone other than the patient: yes (family  )  Review and summarize past medical records: yes  Independent visualization of images, tracings, or specimens: yes    Patient Progress  Patient progress: stable        ED Course       Procedures  1:04 PM  Discussed pt, sx, hx and current findings with Dr Khoi Conway. He is in agreement with plan and will see pt  Osvaldo Smith PA-C      2:30 PM   Dr Khoi Conway in to see pt. Recommends attempting to drain blister given injected margins to eval for infection. Osvaldo Smith PA-C    Procedure Note - Incision and Drainage:   4:43 PM  Performed by: Osvaldo Bunch. AMANDA Smith  Complexity: simple  Skin prepped with surecleans. Sterile field established. Anesthesia achieved with 5 mLs of Bupivacaine 0.5% without epinephrine using a digital block. Abscess to right second finger was penetrated with 22 g needle and no drainage was aspirated. Wound probed and irrigated. Sterile dressing applied. Estimated blood loss: less than 1mL  The procedure took 15 minutes, and pt tolerated well. Osvaldo Smith PA-C    4:15 PM   bs improved to 272. Heart rate improved. Pain improved in finger.  Will discharge with keflex for finger and gabapentin for leg pain that is most likely neuropathic secondary to diabetic non compliance  Irma Smith PA-C    LABORATORY TESTS:  Recent Results (from the past 12 hour(s))   CBC WITH AUTOMATED DIFF    Collection Time: 03/06/18  1:54 PM   Result Value Ref Range    WBC 5.0 3.6 - 11.0 K/uL    RBC 3.99 3.80 - 5.20 M/uL    HGB 11.5 11.5 - 16.0 g/dL    HCT 34.6 (L) 35.0 - 47.0 %    MCV 86.7 80.0 - 99.0 FL    MCH 28.8 26.0 - 34.0 PG    MCHC 33.2 30.0 - 36.5 g/dL    RDW 12.8 11.5 - 14.5 %    PLATELET 521 321 - 444 K/uL    MPV 11.3 8.9 - 12.9 FL    NEUTROPHILS 64 32 - 75 %    LYMPHOCYTES 27 12 - 49 %    MONOCYTES 8 5 - 13 %    EOSINOPHILS 1 0 - 7 %    BASOPHILS 0 0 - 1 %    ABS. NEUTROPHILS 3.1 1.8 - 8.0 K/UL    ABS. LYMPHOCYTES 1.4 0.8 - 3.5 K/UL    ABS. MONOCYTES 0.4 0.0 - 1.0 K/UL    ABS. EOSINOPHILS 0.1 0.0 - 0.4 K/UL    ABS. BASOPHILS 0.0 0.0 - 0.1 K/UL    DF SMEAR SCANNED      RBC COMMENTS NORMOCYTIC, NORMOCHROMIC      XXWBCSUS 1     METABOLIC PANEL, COMPREHENSIVE    Collection Time: 03/06/18  1:54 PM   Result Value Ref Range    Sodium 140 136 - 145 mmol/L    Potassium 4.4 3.5 - 5.1 mmol/L    Chloride 104 97 - 108 mmol/L    CO2 29 21 - 32 mmol/L    Anion gap 7 5 - 15 mmol/L    Glucose 356 (H) 65 - 100 mg/dL    BUN 12 6 - 20 MG/DL    Creatinine 0.98 0.55 - 1.02 MG/DL    BUN/Creatinine ratio 12 12 - 20      GFR est AA >60 >60 ml/min/1.73m2    GFR est non-AA >60 >60 ml/min/1.73m2    Calcium 8.9 8.5 - 10.1 MG/DL    Bilirubin, total 0.2 0.2 - 1.0 MG/DL    ALT (SGPT) 30 12 - 78 U/L    AST (SGOT) 27 15 - 37 U/L    Alk.  phosphatase 123 (H) 45 - 117 U/L    Protein, total 6.4 6.4 - 8.2 g/dL    Albumin 3.2 (L) 3.5 - 5.0 g/dL    Globulin 3.2 2.0 - 4.0 g/dL    A-G Ratio 1.0 (L) 1.1 - 2.2     GLUCOSE, POC    Collection Time: 03/06/18  3:59 PM   Result Value Ref Range    Glucose (POC) 272 (H) 65 - 100 mg/dL    Performed by Suly Llanos (PCT)        IMAGING RESULTS:    Duplex Lower Ext Venous Right    Result Date: 3/6/2018  Clinical history: Right leg swelling and pain for 4 days. INDICATION:  right leg sweling COMPARISON: 2015 examination, 9/9/2015 FINDINGS: Duplex Doppler sonography of the right lower extremity was performed from the groin to the calf. The right common femoral, femoral and popliteal veins are compressible with normal color-flow and wave forms and response to physiologic maneuvers including Valsalva and augmentation. IMPRESSION:  No deep venous thrombosis identified. MEDICATIONS GIVEN:  Medications   bupivacaine (PF) (MARCAINE) 0.5 % (5 mg/mL) injection 25 mg (25 mg SubCUTAneous Given by Provider 3/6/18 8235)   sodium chloride 0.9 % bolus infusion 1,000 mL (0 mL IntraVENous IV Completed 3/6/18 0150)       IMPRESSION:  1. Hyperglycemia    2. Right leg swelling    3. Blister (nonthermal) of right index finger, initial encounter    4. Leg pain, right        PLAN:  1. Discharge Medication List as of 3/6/2018  4:25 PM      START taking these medications    Details   cephALEXin (KEFLEX) 500 mg capsule Take 1 Cap by mouth three (3) times daily. , Print, Disp-15 Cap, R-0      gabapentin (NEURONTIN) 300 mg capsule Take 1 Cap by mouth three (3) times daily. , Print, Disp-15 Cap, R-0         CONTINUE these medications which have NOT CHANGED    Details   insulin glargine (LANTUS SOLOSTAR) 100 unit/mL (3 mL) pen 40 Units by SubCUTAneous route Daily (before breakfast). , Print, Disp-1 Each, R-2      insulin lispro (HUMALOG) 100 unit/mL injection 6 Units by SubCUTAneous route Before breakfast, lunch, and dinner., Print, Disp-1 Vial, R-2      lisinopril (PRINIVIL, ZESTRIL) 5 mg tablet Take 1 Tab by mouth daily. , Print, Disp-30 Tab, R-1      Insulin Syringe-Needle U-100 (INSULIN SYRINGE) 1 mL 29 gauge x 1/2\" syrg Use as directed, Print, Disp-100 Syringe, R-2      lancets (FREESTYLE LANCETS) 28 gauge misc Use as directed, Print, Disp-100 Lancet, R-2      glucose blood VI test strips (FREESTYLE TEST) strip Use as directed, Print, Disp-100 Strip, R-2      topiramate (TOPAMAX) 100 mg tablet Take 100 mg by mouth two (2) times a day., Historical Med      clonazePAM (KLONOPIN) 0.5 mg tablet Take 0.5 mg by mouth three (3) times daily. Indications: anxiety, Historical Med      zolpidem (AMBIEN) 10 mg tablet Take 10 mg by mouth nightly as needed for Sleep., Historical Med      atorvastatin (LIPITOR) 40 mg tablet Take 40 mg by mouth daily. , Historical Med      diazePAM (VALIUM) 5 mg tablet Take 1 Tab by mouth three (3) times daily as needed (spasm). Max Daily Amount: 15 mg., Print, Disp-6 Tab, R-0      nitroglycerin (NITROSTAT) 0.4 mg SL tablet 0.4 mg by SubLINGual route every five (5) minutes as needed for Chest Pain., Historical Med           2. Follow-up Information     Follow up With Details Comments BERT Matute  2-4 days for recheck [address] [place] [address]  [phone]          Return to ED if worse       4:45 PM  Pt has been reexamined. Pt has no new complaints, changes or physical findings. Care plan outlined and precautions discussed. All available results were reviewed with pt. All medications were reviewed with pt. All of pt's questions and concerns were addressed. Pt agrees to F/U as instructed and agrees to return to ED upon further deterioration. Pt is ready to go home.   NANCY Camp

## 2018-03-06 NOTE — DISCHARGE INSTRUCTIONS
Learning About High Blood Sugar  What is high blood sugar? Your body turns the food you eat into glucose (sugar), which it uses for energy. But if your body isn't able to use the sugar right away, it can build up in your blood and lead to high blood sugar. When the amount of sugar in your blood stays too high for too much of the time, you may have diabetes. Diabetes is a disease that can cause serious health problems. The good news is that lifestyle changes may help you get your blood sugar back to normal and avoid or delay diabetes. What causes high blood sugar? Sugar (glucose) can build up in your blood if you:  · Are overweight. · Have a family history of diabetes. · Take certain medicines, such as steroids. What are the symptoms? Having high blood sugar may not cause any symptoms at all. Or it may make you feel very thirsty or very hungry. You may also urinate more often than usual, have blurry vision, or lose weight without trying. How is high blood sugar treated? You can take steps to lower your blood sugar level if you understand what makes it get higher. Your doctor may want you to learn how to test your blood sugar level at home. Then you can see how illness, stress, or different kinds of food or medicine raise or lower your blood sugar level. Other tests may be needed to see if you have diabetes. How can you prevent high blood sugar? · Watch your weight. If you're overweight, losing just a small amount of weight may help. Reducing fat around your waist is most important. · Limit the amount of calories, sweets, and unhealthy fat you eat. Ask your doctor if a dietitian can help you. A registered dietitian can help you create meal plans that fit your lifestyle. · Get at least 30 minutes of exercise on most days of the week. Exercise helps control your blood sugar. It also helps you maintain a healthy weight. Walking is a good choice.  You also may want to do other activities, such as running, swimming, cycling, or playing tennis or team sports. · If your doctor prescribed medicines, take them exactly as prescribed. Call your doctor if you think you are having a problem with your medicine. You will get more details on the specific medicines your doctor prescribes. Follow-up care is a key part of your treatment and safety. Be sure to make and go to all appointments, and call your doctor if you are having problems. It's also a good idea to know your test results and keep a list of the medicines you take. Where can you learn more? Go to http://adamarisLalalamaliana.info/. Enter O108 in the search box to learn more about \"Learning About High Blood Sugar. \"  Current as of: March 13, 2017  Content Version: 11.4  © 5939-8068 Healthwise, [employer]. Care instructions adapted under license by [employer] (which disclaims liability or warranty for this information). If you have questions about a medical condition or this instruction, always ask your healthcare professional. [place] [address] any warranty or liability for your use of this information. We hope that we have addressed all of your medical concerns. The examination and treatment you received in the Emergency Department were for an emergent problem and were not intended as complete care. It is important that you follow up with your healthcare provider(s) for ongoing care. If your symptoms worsen or do not improve as expected, and you are unable to reach your usual health care provider(s), you should return to the Emergency Department. Today's healthcare is undergoing tremendous change, and patient satisfaction surveys are one of the many tools to assess the quality of medical care. You may receive a survey from the [employer] organization regarding your experience in the Emergency Department.   I hope that your experience has been completely positive, particularly the medical care that I provided. As such, please participate in the survey; anything less than excellent does not meet my expectations or intentions. [facility] and [facility] participate in nationally recognized quality of care measures. If your blood pressure is greater than 120/80, as reported below, we urge that you seek medical care to address the potential of high blood pressure, commonly known as hypertension. Hypertension can be hereditary or can be caused by certain medical conditions, pain, stress, or \"white coat syndrome. \"       Please make an appointment with your health care provider(s) for follow up of your Emergency Department visit. VITALS:   Patient Vitals for the past 8 hrs:   Temp Pulse Resp BP SpO2   03/06/18 1512 - - - - 99 %   03/06/18 1509 - - - - 95 %   03/06/18 1506 - - - - 98 %   03/06/18 1246 98 °F (36.7 °C) (!) 120 18 (!) 132/91 96 %          Thank you for allowing us to provide you with medical care today. We realize that you have many choices for your emergency care needs. Please choose us in the future for any continued health care needs. Willian Smith, [employer].   Office: [phone]            Recent Results (from the past 24 hour(s))   CBC WITH AUTOMATED DIFF    Collection Time: 03/06/18  1:54 PM   Result Value Ref Range    WBC 5.0 3.6 - 11.0 K/uL    RBC 3.99 3.80 - 5.20 M/uL    HGB 11.5 11.5 - 16.0 g/dL    HCT 34.6 (L) 35.0 - 47.0 %    MCV 86.7 80.0 - 99.0 FL    MCH 28.8 26.0 - 34.0 PG    MCHC 33.2 30.0 - 36.5 g/dL    RDW 12.8 11.5 - 14.5 %    PLATELET 917 118 - 768 K/uL    MPV 11.3 8.9 - 12.9 FL    NEUTROPHILS 64 32 - 75 %    LYMPHOCYTES 27 12 - 49 %    MONOCYTES 8 5 - 13 %    EOSINOPHILS 1 0 - 7 %    BASOPHILS 0 0 - 1 %    ABS. NEUTROPHILS 3.1 1.8 - 8.0 K/UL    ABS. LYMPHOCYTES 1.4 0.8 - 3.5 K/UL    ABS. MONOCYTES 0.4 0.0 - 1.0 K/UL    ABS. EOSINOPHILS 0.1 0.0 - 0.4 K/UL    ABS. BASOPHILS 0.0 0.0 - 0.1 K/UL    DF SMEAR SCANNED      RBC COMMENTS NORMOCYTIC, NORMOCHROMIC      XXWBCSUS 1     METABOLIC PANEL, COMPREHENSIVE    Collection Time: 03/06/18  1:54 PM   Result Value Ref Range    Sodium 140 136 - 145 mmol/L    Potassium 4.4 3.5 - 5.1 mmol/L    Chloride 104 97 - 108 mmol/L    CO2 29 21 - 32 mmol/L    Anion gap 7 5 - 15 mmol/L    Glucose 356 (H) 65 - 100 mg/dL    BUN 12 6 - 20 MG/DL    Creatinine 0.98 0.55 - 1.02 MG/DL    BUN/Creatinine ratio 12 12 - 20      GFR est AA >60 >60 ml/min/1.73m2    GFR est non-AA >60 >60 ml/min/1.73m2    Calcium 8.9 8.5 - 10.1 MG/DL    Bilirubin, total 0.2 0.2 - 1.0 MG/DL    ALT (SGPT) 30 12 - 78 U/L    AST (SGOT) 27 15 - 37 U/L    Alk. phosphatase 123 (H) 45 - 117 U/L    Protein, total 6.4 6.4 - 8.2 g/dL    Albumin 3.2 (L) 3.5 - 5.0 g/dL    Globulin 3.2 2.0 - 4.0 g/dL    A-G Ratio 1.0 (L) 1.1 - 2.2     GLUCOSE, POC    Collection Time: 03/06/18  3:59 PM   Result Value Ref Range    Glucose (POC) 272 (H) 65 - 100 mg/dL    Performed by Adela Stevens (PCT)        Duplex Lower Ext Venous Right    Result Date: 3/6/2018  Clinical history: Right leg swelling and pain for 4 days. INDICATION:  right leg sweling COMPARISON: 2015 examination, 9/9/2015 FINDINGS: Duplex Doppler sonography of the right lower extremity was performed from the groin to the calf. The right common femoral, femoral and popliteal veins are compressible with normal color-flow and wave forms and response to physiologic maneuvers including Valsalva and augmentation. IMPRESSION:  No deep venous thrombosis identified. Wound Care: After Your Visit  Your Care Instructions  Taking good care of your wound at home will help it heal quickly and reduce your chance of infection. The doctor has checked you carefully, but problems can develop later. If you notice any problems or new symptoms, get medical treatment right away. Follow-up care is a key part of your treatment and safety.  Be sure to make and go to all appointments, and call your doctor if you are having problems. It's also a good idea to know your test results and keep a list of the medicines you take. How can you care for yourself at home? · Clean the area with soap and water 2 times a day unless your doctor gives you different instructions. Don't use hydrogen peroxide or alcohol, which can slow healing. ¨ You may cover the wound with a thin layer of antibiotic ointment, such as bacitracin, and a nonstick bandage. ¨ Apply more ointment and replace the bandage as needed. · Take pain medicines exactly as directed. Some pain is normal with a wound, but do not ignore pain that is getting worse instead of better. You could have an infection. ¨ If the doctor gave you a prescription medicine for pain, take it as prescribed. ¨ If you are not taking a prescription pain medicine, ask your doctor if you can take an over-the-counter medicine. · Your doctor may have closed your wound with stitches (sutures), staples, or skin glue. ¨ If you have stitches, your doctor may remove them after several days to 2 weeks. Or you may have stitches that dissolve on their own. ¨ If you have staples, your doctor may remove them after 7 to 10 days. ¨ If your wound was closed with skin glue, the glue will wear off in a few days to 2 weeks. When should you call for help? Call your doctor now or seek immediate medical care if:  · You have signs of infection, such as:  ¨ Increased pain, swelling, warmth, or redness near the wound. ¨ Red streaks leading from the wound. ¨ Pus draining from the wound. ¨ A fever. · You bleed so much from your incision that you soak one or more bandages over 2 to 4 hours. Watch closely for changes in your health, and be sure to contact your doctor if:  · The wound is not getting better each day. Where can you learn more?    Go to Fliqq.be  Enter M973 in the search box to learn more about \"Wound Care: After Your Visit. \"   © 4798-9745 Healthwise, [employer]. Care instructions adapted under license by [employer] (which disclaims liability or warranty for this information). This care instruction is for use with your licensed healthcare professional. If you have questions about a medical condition or this instruction, always ask your healthcare professional. [address] any warranty or liability for your use of this information. Content Version: 30.9.735531; Last Revised: April 23, 2012                 Leg and Ankle Edema: Care Instructions  Your Care Instructions  Swelling in the legs, ankles, and feet is called edema. It is common after you sit or stand for a while. Long plane flights or car rides often cause swelling in the legs and feet. You may also have swelling if you have to stand for long periods of time at your job. Problems with the veins in the legs (varicose veins) and changes in hormones can also cause swelling. Sometimes the swelling in the ankles and feet is caused by a more serious problem, such as heart failure, infection, blood clots, or liver or kidney disease. Follow-up care is a key part of your treatment and safety. Be sure to make and go to all appointments, and call your doctor if you are having problems. It's also a good idea to know your test results and keep a list of the medicines you take. How can you care for yourself at home? · If your doctor gave you medicine, take it as prescribed. Call your doctor if you think you are having a problem with your medicine. · Whenever you are resting, raise your legs up. Try to keep the swollen area higher than the level of your heart. · Take breaks from standing or sitting in one position. ¨ Walk around to increase the blood flow in your lower legs. ¨ Move your feet and ankles often while you stand, or tighten and relax your leg muscles. · Wear support stockings.  Put them on in the morning, before swelling gets worse.  · Eat a balanced diet. Lose weight if you need to. · Limit the amount of salt (sodium) in your diet. Salt holds fluid in the body and may increase swelling. When should you call for help? Call 911 anytime you think you may need emergency care. For example, call if:  ? · You have symptoms of a blood clot in your lung (called a pulmonary embolism). These may include:  ¨ Sudden chest pain. ¨ Trouble breathing. ¨ Coughing up blood. ?Call your doctor now or seek immediate medical care if:  ? · You have signs of a blood clot, such as:  ¨ Pain in your calf, back of the knee, thigh, or groin. ¨ Redness and swelling in your leg or groin. ? · You have symptoms of infection, such as:  ¨ Increased pain, swelling, warmth, or redness. ¨ Red streaks or pus. ¨ A fever. ? Watch closely for changes in your health, and be sure to contact your doctor if:  ? · Your swelling is getting worse. ? · You have new or worsening pain in your legs. ? · You do not get better as expected. Where can you learn more? Go to http://adamaris-liana.info/. Enter N249 in the search box to learn more about \"Leg and Ankle Edema: Care Instructions. \"  Current as of: March 20, 2017  Content Version: 11.4  © 6512-3109 [employer]. Care instructions adapted under license by [employer] (which disclaims liability or warranty for this information). If you have questions about a medical condition or this instruction, always ask your healthcare professional. [place] [address] any warranty or liability for your use of this information.

## 2018-04-01 ENCOUNTER — HOSPITAL ENCOUNTER (EMERGENCY)
Age: 43
Discharge: HOME | End: 2018-04-01
Attending: EMERGENCY MEDICINE
Payer: MEDICAID

## 2018-04-01 ENCOUNTER — APPOINTMENT (OUTPATIENT)
Dept: GENERAL RADIOLOGY | Age: 43
End: 2018-04-01
Attending: PHYSICIAN ASSISTANT
Payer: MEDICAID

## 2018-04-01 ENCOUNTER — APPOINTMENT (OUTPATIENT)
Dept: CT IMAGING | Age: 43
End: 2018-04-01
Attending: PHYSICIAN ASSISTANT
Payer: MEDICAID

## 2018-04-01 VITALS
TEMPERATURE: 98.5 F | OXYGEN SATURATION: 100 % | BODY MASS INDEX: 32.18 KG/M2 | HEIGHT: 67 IN | HEART RATE: 90 BPM | SYSTOLIC BLOOD PRESSURE: 149 MMHG | WEIGHT: 205 LBS | RESPIRATION RATE: 14 BRPM | DIASTOLIC BLOOD PRESSURE: 96 MMHG

## 2018-04-01 DIAGNOSIS — R07.9 CHEST PAIN, UNSPECIFIED TYPE: Primary | ICD-10-CM

## 2018-04-01 LAB
ALBUMIN SERPL-MCNC: 3.7 G/DL (ref 3.5–5)
ALBUMIN/GLOB SERPL: 1 {RATIO} (ref 1.1–2.2)
ALP SERPL-CCNC: 114 U/L (ref 45–117)
ALT SERPL-CCNC: 20 U/L (ref 12–78)
ANION GAP SERPL CALC-SCNC: 10 MMOL/L (ref 5–15)
AST SERPL-CCNC: 9 U/L (ref 15–37)
BASOPHILS # BLD: 0 K/UL (ref 0–0.1)
BASOPHILS NFR BLD: 0 % (ref 0–1)
BILIRUB SERPL-MCNC: <0.1 MG/DL (ref 0.2–1)
BUN SERPL-MCNC: 9 MG/DL (ref 6–20)
BUN/CREAT SERPL: 7 (ref 12–20)
CALCIUM SERPL-MCNC: 9.2 MG/DL (ref 8.5–10.1)
CHLORIDE SERPL-SCNC: 107 MMOL/L (ref 97–108)
CO2 SERPL-SCNC: 29 MMOL/L (ref 21–32)
CREAT SERPL-MCNC: 1.32 MG/DL (ref 0.55–1.02)
DIFFERENTIAL METHOD BLD: NORMAL
EOSINOPHIL # BLD: 0.2 K/UL (ref 0–0.4)
EOSINOPHIL NFR BLD: 3 % (ref 0–7)
ERYTHROCYTE [DISTWIDTH] IN BLOOD BY AUTOMATED COUNT: 13.3 % (ref 11.5–14.5)
GLOBULIN SER CALC-MCNC: 3.7 G/DL (ref 2–4)
GLUCOSE SERPL-MCNC: 126 MG/DL (ref 65–100)
HCT VFR BLD AUTO: 36.2 % (ref 35–47)
HGB BLD-MCNC: 11.6 G/DL (ref 11.5–16)
IMM GRANULOCYTES # BLD: 0 K/UL (ref 0–0.04)
IMM GRANULOCYTES NFR BLD AUTO: 0 % (ref 0–0.5)
LYMPHOCYTES # BLD: 2.4 K/UL (ref 0.8–3.5)
LYMPHOCYTES NFR BLD: 32 % (ref 12–49)
MCH RBC QN AUTO: 27.9 PG (ref 26–34)
MCHC RBC AUTO-ENTMCNC: 32 G/DL (ref 30–36.5)
MCV RBC AUTO: 87 FL (ref 80–99)
MONOCYTES # BLD: 0.4 K/UL (ref 0–1)
MONOCYTES NFR BLD: 5 % (ref 5–13)
NEUTS SEG # BLD: 4.5 K/UL (ref 1.8–8)
NEUTS SEG NFR BLD: 59 % (ref 32–75)
NRBC # BLD: 0 K/UL (ref 0–0.01)
NRBC BLD-RTO: 0 PER 100 WBC
PLATELET # BLD AUTO: 291 K/UL (ref 150–400)
PMV BLD AUTO: 10.5 FL (ref 8.9–12.9)
POTASSIUM SERPL-SCNC: 3.4 MMOL/L (ref 3.5–5.1)
PROT SERPL-MCNC: 7.4 G/DL (ref 6.4–8.2)
RBC # BLD AUTO: 4.16 M/UL (ref 3.8–5.2)
SODIUM SERPL-SCNC: 146 MMOL/L (ref 136–145)
TROPONIN I SERPL-MCNC: <0.04 NG/ML
WBC # BLD AUTO: 7.6 K/UL (ref 3.6–11)

## 2018-04-01 PROCEDURE — 74011636320 HC RX REV CODE- 636/320: Performed by: RADIOLOGY

## 2018-04-01 PROCEDURE — 96375 TX/PRO/DX INJ NEW DRUG ADDON: CPT

## 2018-04-01 PROCEDURE — 99284 EMERGENCY DEPT VISIT MOD MDM: CPT

## 2018-04-01 PROCEDURE — 36415 COLL VENOUS BLD VENIPUNCTURE: CPT | Performed by: PHYSICIAN ASSISTANT

## 2018-04-01 PROCEDURE — 85025 COMPLETE CBC W/AUTO DIFF WBC: CPT | Performed by: PHYSICIAN ASSISTANT

## 2018-04-01 PROCEDURE — 74011250636 HC RX REV CODE- 250/636: Performed by: PHYSICIAN ASSISTANT

## 2018-04-01 PROCEDURE — 96374 THER/PROPH/DIAG INJ IV PUSH: CPT

## 2018-04-01 PROCEDURE — 80053 COMPREHEN METABOLIC PANEL: CPT | Performed by: PHYSICIAN ASSISTANT

## 2018-04-01 PROCEDURE — 71275 CT ANGIOGRAPHY CHEST: CPT

## 2018-04-01 PROCEDURE — 93005 ELECTROCARDIOGRAM TRACING: CPT

## 2018-04-01 PROCEDURE — 84484 ASSAY OF TROPONIN QUANT: CPT | Performed by: PHYSICIAN ASSISTANT

## 2018-04-01 RX ORDER — ONDANSETRON 2 MG/ML
4 INJECTION INTRAMUSCULAR; INTRAVENOUS
Status: COMPLETED | OUTPATIENT
Start: 2018-04-01 | End: 2018-04-01

## 2018-04-01 RX ORDER — MORPHINE SULFATE 4 MG/ML
4 INJECTION, SOLUTION INTRAMUSCULAR; INTRAVENOUS
Status: COMPLETED | OUTPATIENT
Start: 2018-04-01 | End: 2018-04-01

## 2018-04-01 RX ORDER — DOXYCYCLINE HYCLATE 100 MG
100 TABLET ORAL 2 TIMES DAILY
Qty: 14 TAB | Refills: 0 | Status: SHIPPED | OUTPATIENT
Start: 2018-04-01 | End: 2018-04-08

## 2018-04-01 RX ORDER — ALBUTEROL SULFATE 90 UG/1
2 AEROSOL, METERED RESPIRATORY (INHALATION)
Qty: 1 INHALER | Refills: 0 | Status: SHIPPED | OUTPATIENT
Start: 2018-04-01 | End: 2018-08-22

## 2018-04-01 RX ADMIN — MORPHINE SULFATE 4 MG: 4 INJECTION, SOLUTION INTRAMUSCULAR; INTRAVENOUS at 18:03

## 2018-04-01 RX ADMIN — IOPAMIDOL 100 ML: 755 INJECTION, SOLUTION INTRAVENOUS at 16:25

## 2018-04-01 RX ADMIN — ONDANSETRON 4 MG: 2 INJECTION INTRAMUSCULAR; INTRAVENOUS at 18:02

## 2018-04-01 NOTE — ED TRIAGE NOTES
Pt started with chest pain last night and then it resolved. She did not do anything to make it better. Pain came on all of a sudden. Pain goes down into the arm area. Pain comes and goes. At its worst it is a 10/10 at its best 3/10. Reports SOB, Dizziness. Of note patient is speaking on the cell phone during triage process.

## 2018-04-01 NOTE — ED PROVIDER NOTES
HPI Comments: 38 y.o. female with extensive past medical history, please see list, significant for CAD, Pericarditis, HTN, DM, DVT, GERD, Anxiety, Fibromyalgia who presents from home with chief complaint of chest pain. Pt reports an acute onset of diffuse chest pain 1 night ago. The pain has been intermittent since onset. She describes the pain as severe, 10/10, radiating down arms b/l. Pt reports accompanying symptoms of shortness of breath and dizziness. No exacerbating or alleviating factors noted. Pt has not taken any for her symptoms. Pt denies known fever, chills, nausea, vomiting, diarrhea, neck pain, back pain, headache, numbness, weakness, cough, congestion, rhinorrhea or sore throat. There are no other acute medical concerns at this time. Chart review: Pt evaluated in ED 3/6/18 for hyperglycemia, right leg pain and swelling and blister of right index finger. Duplex of right lower extremity unremarkable for DVT. Pt discharged home on Keflex and Gabapentin. PCP: Chepe Lopez NP    Note written by Cristela Otero, as dictated by Antionette Lal PA-C 7:32 PM    The history is provided by the patient. No  was used.         Past Medical History:   Diagnosis Date    Anxiety     Arthritis     Autoimmune disease ([address]     Bronchitis     CAD (coronary artery disease)     Chronic pain     fibromyalgia    Diabetes ([address]     Fibromyalgia     GERD (gastroesophageal reflux disease)     H/O pericarditis     High cholesterol     History of continuous positive airway pressure (CPAP) therapy     Hypertension     Irregular heart beat     Morbid obesity ([address]     Other ill-defined conditions(799.89)     sleep apnea    Thromboembolus ([address]     2010 Leg DVT    Unspecified sleep apnea     cpap       Past Surgical History:   Procedure Laterality Date    HX HEENT  2009    tonsillectomy    HX ORTHOPAEDIC  2010    right knee patellar surgery with hardware    HX IRVING AND BSO 2005    hysterectomy         Family History:   Problem Relation Age of Onset    Diabetes Mother     Hypertension Mother     Heart Disease Mother     Diabetes Father     Hypertension Father     Heart Disease Father     Diabetes Sister     Diabetes Sister     Hypertension Sister        Social History     Social History    Marital status:      Spouse name: N/A    Number of children: N/A    Years of education: N/A     Occupational History    Not on file. Social History Main Topics    Smoking status: Never Smoker    Smokeless tobacco: Never Used    Alcohol use No    Drug use: No    Sexual activity: Not Currently     Other Topics Concern    Not on file     Social History Narrative         ALLERGIES: Aspirin; Nsaids (non-steroidal anti-inflammatory drug); and Tramadol    Review of Systems   Constitutional: Negative for chills and fever. HENT: Negative for congestion, rhinorrhea and sore throat. Respiratory: Positive for shortness of breath. Negative for cough. Cardiovascular: Positive for chest pain. Gastrointestinal: Negative for abdominal pain, diarrhea, nausea and vomiting. Musculoskeletal: Negative for back pain and neck pain. Neurological: Positive for dizziness. Negative for weakness, numbness and headaches. All other systems reviewed and are negative. Vitals:    04/01/18 1745 04/01/18 1800 04/01/18 1815 04/01/18 1853   BP: (!) 140/91 126/89 (!) 134/93 (!) 149/96   Pulse:  98 99 90   Resp:  17 12 14   Temp:       SpO2: 99% 94% 97% 100%   Weight:       Height:                Physical Exam   Constitutional: She appears well-developed and well-nourished. HENT:   Head: Normocephalic and atraumatic. Mouth/Throat: Oropharynx is clear and moist.   Eyes: Conjunctivae and EOM are normal. Pupils are equal, round, and reactive to light. No scleral icterus. Neck: Neck supple. No tracheal deviation present.    Cardiovascular: Regular rhythm, normal heart sounds and intact distal pulses. Tachycardia. Pulmonary/Chest: Effort normal and breath sounds normal. No respiratory distress. Diffuse end expiratory wheezes at bilateral lung bases. Abdominal: Soft. She exhibits no distension. There is no tenderness. There is no rebound and no guarding. Genitourinary:   Genitourinary Comments: deferred   Musculoskeletal: She exhibits no edema. Neurological: She is alert. Skin: Skin is warm and dry. Psychiatric: She has a normal mood and affect. Nursing note and vitals reviewed. MDM  Number of Diagnoses or Management Options  Chest pain, unspecified type:   Diagnosis management comments: Pt afebrile and nontoxic appearing. CTA of chest negative for PE. Concern for possible early PNA. Low index of suspicion for PTX, ACS, esophageal rupture, dissection, pancreatitis, appendicitis, cholecystitis/cholagnitis, bowel obstruction/perforation, sepsis or any other acute life threatening condition. Will treat with abx and advise close follow up with family doctor. Reviewed treatment plan with attending and they agree.   Jennifer Jay PA-C        ED Course       Procedures

## 2018-04-01 NOTE — DISCHARGE INSTRUCTIONS
Chest Pain: Care Instructions  Your Care Instructions    There are many things that can cause chest pain. Some are not serious and will get better on their own in a few days. But some kinds of chest pain need more testing and treatment. Your doctor may have recommended a follow-up visit in the next 8 to 12 hours. If you are not getting better, you may need more tests or treatment. Even though your doctor has released you, you still need to watch for any problems. The doctor carefully checked you, but sometimes problems can develop later. If you have new symptoms or if your symptoms do not get better, get medical care right away. If you have worse or different chest pain or pressure that lasts more than 5 minutes or you passed out (lost consciousness), call 911 or seek other emergency help right away. A medical visit is only one step in your treatment. Even if you feel better, you still need to do what your doctor recommends, such as going to all suggested follow-up appointments and taking medicines exactly as directed. This will help you recover and help prevent future problems. How can you care for yourself at home? · Rest until you feel better. · Take your medicine exactly as prescribed. Call your doctor if you think you are having a problem with your medicine. · Do not drive after taking a prescription pain medicine. When should you call for help? Call 911 if:  ? · You passed out (lost consciousness). ? · You have severe difficulty breathing. ? · You have symptoms of a heart attack. These may include:  ¨ Chest pain or pressure, or a strange feeling in your chest.  ¨ Sweating. ¨ Shortness of breath. ¨ Nausea or vomiting. ¨ Pain, pressure, or a strange feeling in your back, neck, jaw, or upper belly or in one or both shoulders or arms. ¨ Lightheadedness or sudden weakness. ¨ A fast or irregular heartbeat.   After you call 911, the  may tell you to chew 1 adult-strength or 2 to 4 low-dose aspirin. Wait for an ambulance. Do not try to drive yourself. ?Call your doctor today if:  ? · You have any trouble breathing. ? · Your chest pain gets worse. ? · You are dizzy or lightheaded, or you feel like you may faint. ? · You are not getting better as expected. ? · You are having new or different chest pain. Where can you learn more? Go to http://adamaris-liana.info/. Enter A120 in the search box to learn more about \"Chest Pain: Care Instructions. \"  Current as of: March 20, 2017  Content Version: 11.4  © 0835-3031 [employer]. Care instructions adapted under license by [employer] (which disclaims liability or warranty for this information). If you have questions about a medical condition or this instruction, always ask your healthcare professional. [place] [address] any warranty or liability for your use of this information. We hope that we have addressed all of your medical concerns. The examination and treatment you received in the Emergency Department were for an emergent problem and were not intended as complete care. It is important that you follow up with your healthcare provider(s) for ongoing care. If your symptoms worsen or do not improve as expected, and you are unable to reach your usual health care provider(s), you should return to the Emergency Department. Today's healthcare is undergoing tremendous change, and patient satisfaction surveys are one of the many tools to assess the quality of medical care. You may receive a survey from the [employer] regarding your experience in the Emergency Department. I hope that your experience has been completely positive, particularly the medical care that I provided. As such, please participate in the survey; anything less than excellent does not meet my expectations or intentions.         [facility] and [employer] participate in nationally recognized quality of care measures. If your blood pressure is greater than 120/80, as reported below, we urge that you seek medical care to address the potential of high blood pressure, commonly known as hypertension. Hypertension can be hereditary or can be caused by certain medical conditions, pain, stress, or \"white coat syndrome. \"       Please make an appointment with your health care provider(s) for follow up of your Emergency Department visit. VITALS:   Patient Vitals for the past 8 hrs:   Temp Pulse Resp BP SpO2   04/01/18 1745 - - - (!) 140/91 99 %   04/01/18 1606 98.5 °F (36.9 °C) (!) 116 17 (!) 129/91 100 %          Thank you for allowing us to provide you with medical care today. We realize that you have many choices for your emergency care needs. Please choose us in the future for any continued health care needs. Haley Mendoza [facility], [address] [place] [address].   Office: [phone]            Recent Results (from the past 24 hour(s))   CBC WITH AUTOMATED DIFF    Collection Time: 04/01/18  5:33 PM   Result Value Ref Range    WBC 7.6 3.6 - 11.0 K/uL    RBC 4.16 3.80 - 5.20 M/uL    HGB 11.6 11.5 - 16.0 g/dL    HCT 36.2 35.0 - 47.0 %    MCV 87.0 80.0 - 99.0 FL    MCH 27.9 26.0 - 34.0 PG    MCHC 32.0 30.0 - 36.5 g/dL    RDW 13.3 11.5 - 14.5 %    PLATELET 699 037 - 337 K/uL    MPV 10.5 8.9 - 12.9 FL    NRBC 0.0 0  WBC    ABSOLUTE NRBC 0.00 0.00 - 0.01 K/uL    NEUTROPHILS 59 32 - 75 %    LYMPHOCYTES 32 12 - 49 %    MONOCYTES 5 5 - 13 %    EOSINOPHILS 3 0 - 7 %    BASOPHILS 0 0 - 1 %    IMMATURE GRANULOCYTES 0 0.0 - 0.5 %    ABS. NEUTROPHILS 4.5 1.8 - 8.0 K/UL    ABS. LYMPHOCYTES 2.4 0.8 - 3.5 K/UL    ABS. MONOCYTES 0.4 0.0 - 1.0 K/UL    ABS. EOSINOPHILS 0.2 0.0 - 0.4 K/UL    ABS. BASOPHILS 0.0 0.0 - 0.1 K/UL    ABS. IMM.  GRANS. 0.0 0.00 - 0.04 K/UL    DF AUTOMATED     METABOLIC PANEL, COMPREHENSIVE    Collection Time: 04/01/18  5:33 PM   Result Value Ref Range    Sodium 146 (H) 136 - 145 mmol/L    Potassium 3.4 (L) 3.5 - 5.1 mmol/L    Chloride 107 97 - 108 mmol/L    CO2 29 21 - 32 mmol/L    Anion gap 10 5 - 15 mmol/L    Glucose 126 (H) 65 - 100 mg/dL    BUN 9 6 - 20 MG/DL    Creatinine 1.32 (H) 0.55 - 1.02 MG/DL    BUN/Creatinine ratio 7 (L) 12 - 20      GFR est AA 54 (L) >60 ml/min/1.73m2    GFR est non-AA 45 (L) >60 ml/min/1.73m2    Calcium 9.2 8.5 - 10.1 MG/DL    Bilirubin, total <0.1 (L) 0.2 - 1.0 MG/DL    ALT (SGPT) 20 12 - 78 U/L    AST (SGOT) 9 (L) 15 - 37 U/L    Alk. phosphatase 114 45 - 117 U/L    Protein, total 7.4 6.4 - 8.2 g/dL    Albumin 3.7 3.5 - 5.0 g/dL    Globulin 3.7 2.0 - 4.0 g/dL    A-G Ratio 1.0 (L) 1.1 - 2.2     TROPONIN I    Collection Time: 04/01/18  5:33 PM   Result Value Ref Range    Troponin-I, Qt. <0.04 <0.05 ng/mL       Cta Chest W Or W Wo Cont    Result Date: 4/1/2018  Clinical indication: Chest pain. Localizer obtained without contrast at the level of the pulmonary arteries. Fast injection rate of 100 cc of Isovue-370 with review of the raw data and MIP reconstructions, comparison September 25, 2017. CT dose reduction was achieved through the use of a standardized protocol tailored for this examination and automatic exposure control for dose modulation . Ledell Jose There is no evidence for pulmonary emboli. Aorta appears unremarkable. There is no pericardial or pleural effusion. No mediastinal masses or adenopathy. No shift or pneumothorax. The heart size is normal. Lung fields minimal interstitial infiltrate at the left base      impression: Small interstitial infiltrate left lung base no other significant findings.

## 2018-04-03 LAB
ATRIAL RATE: 103 BPM
CALCULATED P AXIS, ECG09: 39 DEGREES
CALCULATED R AXIS, ECG10: -7 DEGREES
CALCULATED T AXIS, ECG11: 17 DEGREES
DIAGNOSIS, 93000: NORMAL
P-R INTERVAL, ECG05: 132 MS
Q-T INTERVAL, ECG07: 358 MS
QRS DURATION, ECG06: 86 MS
QTC CALCULATION (BEZET), ECG08: 468 MS
VENTRICULAR RATE, ECG03: 103 BPM

## 2018-08-22 ENCOUNTER — APPOINTMENT (OUTPATIENT)
Dept: GENERAL RADIOLOGY | Age: 43
End: 2018-08-22
Attending: EMERGENCY MEDICINE
Payer: MEDICAID

## 2018-08-22 ENCOUNTER — HOSPITAL ENCOUNTER (EMERGENCY)
Age: 43
Discharge: HOME | End: 2018-08-22
Attending: EMERGENCY MEDICINE
Payer: MEDICAID

## 2018-08-22 VITALS
WEIGHT: 191 LBS | TEMPERATURE: 96.3 F | HEIGHT: 67 IN | SYSTOLIC BLOOD PRESSURE: 136 MMHG | OXYGEN SATURATION: 97 % | BODY MASS INDEX: 29.98 KG/M2 | RESPIRATION RATE: 14 BRPM | HEART RATE: 82 BPM | DIASTOLIC BLOOD PRESSURE: 92 MMHG

## 2018-08-22 DIAGNOSIS — M25.512 ACUTE PAIN OF LEFT SHOULDER: Primary | ICD-10-CM

## 2018-08-22 PROCEDURE — 73030 X-RAY EXAM OF SHOULDER: CPT

## 2018-08-22 PROCEDURE — A4565 SLINGS: HCPCS

## 2018-08-22 PROCEDURE — 99283 EMERGENCY DEPT VISIT LOW MDM: CPT

## 2018-08-22 RX ORDER — LIDOCAINE 50 MG/G
PATCH TOPICAL
Qty: 15 EACH | Refills: 0 | Status: SHIPPED | OUTPATIENT
Start: 2018-08-22 | End: 2018-10-17 | Stop reason: ALTCHOICE

## 2018-08-22 NOTE — ED TRIAGE NOTES
Pt ambulatory to treatment area with c/o \"for about 5 days my left shoulder has been swelling and hurting. \"  Pt denies injury or trauma to area. Pt states \"i have had problems with my right shoulder but not my left shoulder. \"  Pt states \"it hurts to lift and carry things with this arm, my kids have been helping me. \"  Pt reports taking tylenol for pain without relief.

## 2018-08-22 NOTE — ED PROVIDER NOTES
HPI Comments: Hx HTN, DM, CAD, hyperlipidemia, obesity, sleep apnea, fibromyalgia, arthritis, GERD, anxiety, LLE DVT; presents with a 5 day hx of left shoulder pain; it is constant and moderate; it is worse with movement; no injury; Tylenol/ice without relief; she states she is allergic to NSAID's; she denies previous left shoulder problems but states she has a hx of right shoulder problems that eventually required surgery. No other complaints. Patient is a 38 y.o. female presenting with arm pain. Arm Pain           Past Medical History:   Diagnosis Date    Anxiety     Arthritis     [facility]     Bronchitis     CAD (coronary artery disease)     Chronic pain     fibromyalgia    Diabetes ([facility]     Fibromyalgia     GERD (gastroesophageal reflux disease)     H/O pericarditis     High cholesterol     History of continuous positive airway pressure (CPAP) therapy     Hypertension     Irregular heart beat     Morbid obesity ([facility]     Other ill-defined conditions(799.89)     sleep apnea    Thromboembolus ([facility]     2010 Leg DVT    Unspecified sleep apnea     cpap       Past Surgical History:   Procedure Laterality Date    HX HEENT  2009    tonsillectomy    HX ORTHOPAEDIC  2010    right knee patellar surgery with hardware    HX IRVING AND BSO  2005    hysterectomy         Family History:   Problem Relation Age of Onset    Diabetes Mother     Hypertension Mother     Heart Disease Mother     Diabetes Father     Hypertension Father     Heart Disease Father     Diabetes Sister     Diabetes Sister     Hypertension Sister        Social History     Social History    Marital status:      Spouse name: N/A    Number of children: N/A    Years of education: N/A     Occupational History    Not on file.      Social History Main Topics    Smoking status: Never Smoker    Smokeless tobacco: Never Used    Alcohol use No    Drug use: No    Sexual activity: Not Currently     Other Topics Concern    Not on file     Social History Narrative         ALLERGIES: Aspirin; Nsaids (non-steroidal anti-inflammatory drug); and Tramadol    Review of Systems   All other systems reviewed and are negative. Vitals:    08/22/18 0815   BP: 141/81   Pulse: 82   Resp: 14   Temp: 96.3 °F (35.7 °C)   SpO2: 98%   Weight: 86.6 kg (191 lb)   Height: 5' 7\" (1.702 m)            Physical Exam   Constitutional: She appears well-developed and well-nourished. HENT:   Head: Normocephalic and atraumatic. Eyes: Conjunctivae are normal. No scleral icterus. Neck: No JVD present. No tracheal deviation present. Cardiovascular: Normal rate. Pulmonary/Chest: Effort normal.   Abdominal: She exhibits no distension. Musculoskeletal: She exhibits no edema. Left anterior shoulder tenderness; painful active/passive ROM; 2+ left radial pulse, normal sensation. Neurological: She is alert. oriented   Skin: No rash noted. No pallor. Psychiatric: She has a normal mood and affect. Pt declined Tylenol. 8:28 AM      [facility]      ED Course       Procedures    Progress Note:  Results, treatment, and follow up plan have been discussed with patient. Questions were answered. Helio Mina MD  8:50 AM     researched: In past 2 years for controlled substances: Total Prescriptions: 81 Total Prescribers: 18 Total Pharmacies: 3    A/P: left shoulder pain - unclear etiology; reassuring appearance and exam; VSS; home with and PCP/ortho f/u. Sling and Lidoderm patches.   Helio Mina MD  8:53 AM

## 2018-08-22 NOTE — DISCHARGE INSTRUCTIONS
Shoulder Pain: Care Instructions  Your Care Instructions    You can hurt your shoulder by using it too much during an activity, such as fishing or baseball. It can also happen as part of the everyday wear and tear of getting older. Shoulder injuries can be slow to heal, but your shoulder should get better with time. Your doctor may recommend a sling to rest your shoulder. If you have injured your shoulder, you may need testing and treatment. Follow-up care is a key part of your treatment and safety. Be sure to make and go to all appointments, and call your doctor if you are having problems. It's also a good idea to know your test results and keep a list of the medicines you take. How can you care for yourself at home? · Take pain medicines exactly as directed. ¨ If the doctor gave you a prescription medicine for pain, take it as prescribed. ¨ If you are not taking a prescription pain medicine, ask your doctor if you can take an over-the-counter medicine. ¨ Do not take two or more pain medicines at the same time unless the doctor told you to. Many pain medicines contain acetaminophen, which is Tylenol. Too much acetaminophen (Tylenol) can be harmful. · If your doctor recommends that you wear a sling, use it as directed. Do not take it off before your doctor tells you to. · Put ice or a cold pack on the sore area for 10 to 20 minutes at a time. Put a thin cloth between the ice and your skin. · If there is no swelling, you can put moist heat, a heating pad, or a warm cloth on your shoulder. Some doctors suggest alternating between hot and cold. · Rest your shoulder for a few days. If your doctor recommends it, you can then begin gentle exercise of the shoulder, but do not lift anything heavy. When should you call for help? Call 911 anytime you think you may need emergency care. For example, call if:    · You have chest pain or pressure. This may occur with:  ¨ Sweating.   ¨ Shortness of breath. ¨ Nausea or vomiting. ¨ Pain that spreads from the chest to the neck, jaw, or one or both shoulders or arms. ¨ Dizziness or lightheadedness. ¨ A fast or uneven pulse. After calling 911, chew 1 adult-strength aspirin. Wait for an ambulance. Do not try to drive yourself.     · Your arm or hand is cool or pale or changes color.    Call your doctor now or seek immediate medical care if:    · You have signs of infection, such as:  ¨ Increased pain, swelling, warmth, or redness in your shoulder. ¨ Red streaks leading from a place on your shoulder. ¨ Pus draining from an area of your shoulder. ¨ Swollen lymph nodes in your neck, armpits, or groin. ¨ A fever.    Watch closely for changes in your health, and be sure to contact your doctor if:    · You cannot use your shoulder.     · Your shoulder does not get better as expected. Where can you learn more? Go to http://adamaris-liana.info/. Enter I615 in the search box to learn more about \"Shoulder Pain: Care Instructions. \"  Current as of: November 29, 2017  Content Version: 11.7  © 0449-0907 [employer]. Care instructions adapted under license by [employer] (which disclaims liability or warranty for this information). If you have questions about a medical condition or this instruction, always ask your healthcare professional. [address] any warranty or liability for your use of this information.

## 2018-08-22 NOTE — ED NOTES
The patient was discharged home by Dr. Alicia Mcwilliams. Pt ambulatory to discharge with steady gait.

## 2018-09-20 ENCOUNTER — HOSPITAL ENCOUNTER (EMERGENCY)
Age: 43
Discharge: HOME | End: 2018-09-20
Attending: EMERGENCY MEDICINE
Payer: MEDICAID

## 2018-09-20 ENCOUNTER — APPOINTMENT (OUTPATIENT)
Dept: CT IMAGING | Age: 43
End: 2018-09-20
Attending: EMERGENCY MEDICINE
Payer: MEDICAID

## 2018-09-20 VITALS
OXYGEN SATURATION: 97 % | BODY MASS INDEX: 30.61 KG/M2 | HEART RATE: 104 BPM | DIASTOLIC BLOOD PRESSURE: 84 MMHG | WEIGHT: 195 LBS | HEIGHT: 67 IN | RESPIRATION RATE: 17 BRPM | TEMPERATURE: 98 F | SYSTOLIC BLOOD PRESSURE: 113 MMHG

## 2018-09-20 DIAGNOSIS — R07.9 CHEST PAIN, UNSPECIFIED TYPE: Primary | ICD-10-CM

## 2018-09-20 LAB
ANION GAP SERPL CALC-SCNC: 12 MMOL/L (ref 5–15)
BASOPHILS # BLD: 0 K/UL (ref 0–0.1)
BASOPHILS NFR BLD: 0 % (ref 0–1)
BUN SERPL-MCNC: 19 MG/DL (ref 6–20)
BUN/CREAT SERPL: 16 (ref 12–20)
CALCIUM SERPL-MCNC: 9.2 MG/DL (ref 8.5–10.1)
CHLORIDE SERPL-SCNC: 107 MMOL/L (ref 97–108)
CO2 SERPL-SCNC: 25 MMOL/L (ref 21–32)
COMMENT, HOLDF: NORMAL
CREAT SERPL-MCNC: 1.22 MG/DL (ref 0.55–1.02)
D DIMER PPP FEU-MCNC: 0.42 MG/L FEU (ref 0–0.65)
DIFFERENTIAL METHOD BLD: NORMAL
EOSINOPHIL # BLD: 0 K/UL (ref 0–0.4)
EOSINOPHIL NFR BLD: 1 % (ref 0–7)
ERYTHROCYTE [DISTWIDTH] IN BLOOD BY AUTOMATED COUNT: 14.3 % (ref 11.5–14.5)
GLUCOSE SERPL-MCNC: 240 MG/DL (ref 65–100)
HCT VFR BLD AUTO: 36.1 % (ref 35–47)
HGB BLD-MCNC: 12 G/DL (ref 11.5–16)
LYMPHOCYTES # BLD: 2.1 K/UL (ref 0.8–3.5)
LYMPHOCYTES NFR BLD: 33 % (ref 12–49)
MCH RBC QN AUTO: 28.4 PG (ref 26–34)
MCHC RBC AUTO-ENTMCNC: 33.2 G/DL (ref 30–36.5)
MCV RBC AUTO: 85.3 FL (ref 80–99)
MONOCYTES # BLD: 0.4 K/UL (ref 0–1)
MONOCYTES NFR BLD: 7 % (ref 5–13)
NEUTS SEG # BLD: 3.7 K/UL (ref 1.8–8)
NEUTS SEG NFR BLD: 59 % (ref 32–75)
PLATELET # BLD AUTO: 224 K/UL (ref 150–400)
PMV BLD AUTO: 11 FL (ref 8.9–12.9)
POTASSIUM SERPL-SCNC: 3.8 MMOL/L (ref 3.5–5.1)
RBC # BLD AUTO: 4.23 M/UL (ref 3.8–5.2)
SAMPLES BEING HELD,HOLD: NORMAL
SODIUM SERPL-SCNC: 144 MMOL/L (ref 136–145)
TROPONIN I SERPL-MCNC: <0.05 NG/ML
WBC # BLD AUTO: 6.2 K/UL (ref 3.6–11)
XXWBCSUS: 0

## 2018-09-20 PROCEDURE — 74011250636 HC RX REV CODE- 250/636: Performed by: EMERGENCY MEDICINE

## 2018-09-20 PROCEDURE — 84484 ASSAY OF TROPONIN QUANT: CPT | Performed by: EMERGENCY MEDICINE

## 2018-09-20 PROCEDURE — 96372 THER/PROPH/DIAG INJ SC/IM: CPT

## 2018-09-20 PROCEDURE — 93005 ELECTROCARDIOGRAM TRACING: CPT

## 2018-09-20 PROCEDURE — 36415 COLL VENOUS BLD VENIPUNCTURE: CPT | Performed by: EMERGENCY MEDICINE

## 2018-09-20 PROCEDURE — 74011250637 HC RX REV CODE- 250/637: Performed by: EMERGENCY MEDICINE

## 2018-09-20 PROCEDURE — 99285 EMERGENCY DEPT VISIT HI MDM: CPT

## 2018-09-20 PROCEDURE — 80048 BASIC METABOLIC PNL TOTAL CA: CPT | Performed by: EMERGENCY MEDICINE

## 2018-09-20 PROCEDURE — 85379 FIBRIN DEGRADATION QUANT: CPT | Performed by: EMERGENCY MEDICINE

## 2018-09-20 PROCEDURE — 85025 COMPLETE CBC W/AUTO DIFF WBC: CPT | Performed by: EMERGENCY MEDICINE

## 2018-09-20 RX ORDER — MORPHINE SULFATE 2 MG/ML
2 INJECTION, SOLUTION INTRAMUSCULAR; INTRAVENOUS ONCE
Status: COMPLETED | OUTPATIENT
Start: 2018-09-20 | End: 2018-09-20

## 2018-09-20 RX ORDER — OXYCODONE AND ACETAMINOPHEN 5; 325 MG/1; MG/1
1 TABLET ORAL
Status: COMPLETED | OUTPATIENT
Start: 2018-09-20 | End: 2018-09-20

## 2018-09-20 RX ORDER — MORPHINE SULFATE 2 MG/ML
2 INJECTION, SOLUTION INTRAMUSCULAR; INTRAVENOUS
Status: DISCONTINUED | OUTPATIENT
Start: 2018-09-20 | End: 2018-09-20

## 2018-09-20 RX ADMIN — MORPHINE SULFATE 2 MG: 2 INJECTION, SOLUTION INTRAMUSCULAR; INTRAVENOUS at 20:11

## 2018-09-20 RX ADMIN — OXYCODONE HYDROCHLORIDE AND ACETAMINOPHEN 1 TABLET: 5; 325 TABLET ORAL at 18:33

## 2018-09-20 NOTE — ED NOTES
Pt back from CT, imaging unable to utilize IV. MD notified, 4 attempts for access have been previously made.

## 2018-09-20 NOTE — ED NOTES
Pt  C/o pain and states pain pill not helping. Pain reassessment completed. Informed MD of pain. Meds ordered.

## 2018-09-20 NOTE — ED TRIAGE NOTES
41 yr old brought to tx area by [facility] with c/o chest pain and left arm pain. Onset yesterday. States has nausea, and broke out in sweat. Also feels SOB.

## 2018-09-20 NOTE — ED PROVIDER NOTES
Patient is a 41 y.o. female presenting with chest pain. The history is provided by the patient. Chest Pain This is a new problem. The current episode started yesterday. The problem has not changed since onset. The pain is present in the left side. Quality: aching. The pain radiates to the left shoulder. Associated symptoms include diaphoresis, malaise/fatigue, nausea and shortness of breath. Pertinent negatives include no abdominal pain, no back pain, no cough, no dizziness, no exertional chest pressure, no fever, no irregular heartbeat, no near-syncope, no palpitations and no vomiting. Risk factors include diabetes mellitus, obesity, hypertension and dyslipidemia. Past Medical History:  
Diagnosis Date  Anxiety  Arthritis  Autoimmune disease ([address]  Bronchitis  CAD (coronary artery disease)  Chronic pain   
 fibromyalgia  Diabetes ([address]  Fibromyalgia  GERD (gastroesophageal reflux disease)  H/O pericarditis  High cholesterol  History of continuous positive airway pressure (CPAP) therapy  Hypertension  Irregular heart beat  Morbid obesity ([address]  Other ill-defined conditions(799.89)   
 sleep apnea  Thromboembolus ([address] 2010 Leg DVT  Unspecified sleep apnea   
 cpap Past Surgical History:  
Procedure Laterality Date  HX HEENT  2009  
 tonsillectomy  HX ORTHOPAEDIC  2010  
 right knee patellar surgery with hardware  HX IRVING AND BSO  2005  
 hysterectomy Family History:  
Problem Relation Age of Onset  Diabetes Mother  Hypertension Mother  Heart Disease Mother  Diabetes Father  Hypertension Father  Heart Disease Father  Diabetes Sister  Diabetes Sister  Hypertension Sister Social History Social History  Marital status:  Spouse name: N/A  
 Number of children: N/A  
 Years of education: N/A Occupational History  Not on file. Social History Main Topics  Smoking status: Never Smoker  Smokeless tobacco: Never Used  Alcohol use No  
 Drug use: No  
 Sexual activity: Not Currently Other Topics Concern  Not on file Social History Narrative ALLERGIES: Aspirin; Nsaids (non-steroidal anti-inflammatory drug); and Tramadol Review of Systems Constitutional: Positive for diaphoresis and malaise/fatigue. Negative for fever. Respiratory: Positive for shortness of breath. Negative for cough and chest tightness. Cardiovascular: Positive for chest pain. Negative for palpitations and near-syncope. Gastrointestinal: Positive for nausea. Negative for abdominal pain and vomiting. Musculoskeletal: Negative for back pain and neck pain. Left shoulder pain Neurological: Negative for dizziness. All other systems reviewed and are negative. Vitals:  
 09/20/18 1815 09/20/18 1816 09/20/18 1859 BP: 126/88 126/88 111/71 Pulse: (!) 115 (!) 117 (!) 119 Resp: 16 16 20 Temp: 98 °F (36.7 °C) 98 °F (36.7 °C) SpO2: 98% 98% 96% Weight:  88.5 kg (195 lb) Height:  5' 7\" (1.702 m) Physical Exam  
Constitutional: She is oriented to person, place, and time. She appears well-developed and well-nourished. She appears distressed. HENT:  
Head: Normocephalic and atraumatic. Eyes: Conjunctivae and EOM are normal. Pupils are equal, round, and reactive to light. Neck: Normal range of motion. Cardiovascular: Regular rhythm, normal heart sounds and intact distal pulses. Tachycardia present. No murmur heard. Pulmonary/Chest: Effort normal and breath sounds normal. No stridor. No respiratory distress. She has no wheezes. She exhibits no tenderness. Abdominal: Soft. Bowel sounds are normal. There is no tenderness. There is no rebound. Musculoskeletal: Normal range of motion. She exhibits no edema, tenderness or deformity. Neurological: She is alert and oriented to person, place, and time. No cranial nerve deficit. Skin: Skin is warm and dry. She is not diaphoretic. Psychiatric: She has a normal mood and affect. Nursing note and vitals reviewed. MDM Number of Diagnoses or Management Options Diagnosis management comments: Atypical chest pain since yesterday - eval for PE, possible ACS but less likely - check ekg and troponin x 1 - next provider to review results and disposition the patient. EK18 @ 18:10 sinus tachycardia, rate 126, no ischemia, normal intervals. 7:09 PM 
Change of shift. Care of patient signed over to Dr. Ghada Osei. patient handoff complete. Amount and/or Complexity of Data Reviewed Clinical lab tests: ordered Tests in the radiology section of CPT®: ordered ED Course Procedures

## 2018-09-21 LAB
ATRIAL RATE: 126 BPM
CALCULATED P AXIS, ECG09: 44 DEGREES
CALCULATED R AXIS, ECG10: 3 DEGREES
CALCULATED T AXIS, ECG11: 30 DEGREES
DIAGNOSIS, 93000: NORMAL
P-R INTERVAL, ECG05: 128 MS
Q-T INTERVAL, ECG07: 302 MS
QRS DURATION, ECG06: 88 MS
QTC CALCULATION (BEZET), ECG08: 437 MS
VENTRICULAR RATE, ECG03: 126 BPM

## 2018-09-21 NOTE — DISCHARGE INSTRUCTIONS
Chest Pain: Care Instructions  Your Care Instructions    There are many things that can cause chest pain. Some are not serious and will get better on their own in a few days. But some kinds of chest pain need more testing and treatment. Your doctor may have recommended a follow-up visit in the next 8 to 12 hours. If you are not getting better, you may need more tests or treatment. Even though your doctor has released you, you still need to watch for any problems. The doctor carefully checked you, but sometimes problems can develop later. If you have new symptoms or if your symptoms do not get better, get medical care right away. If you have worse or different chest pain or pressure that lasts more than 5 minutes or you passed out (lost consciousness), call 911 or seek other emergency help right away. A medical visit is only one step in your treatment. Even if you feel better, you still need to do what your doctor recommends, such as going to all suggested follow-up appointments and taking medicines exactly as directed. This will help you recover and help prevent future problems. How can you care for yourself at home? · Rest until you feel better. · Take your medicine exactly as prescribed. Call your doctor if you think you are having a problem with your medicine. · Do not drive after taking a prescription pain medicine. When should you call for help? Call 911 if:    · You passed out (lost consciousness).     · You have severe difficulty breathing.     · You have symptoms of a heart attack. These may include:  ¨ Chest pain or pressure, or a strange feeling in your chest.  ¨ Sweating. ¨ Shortness of breath. ¨ Nausea or vomiting. ¨ Pain, pressure, or a strange feeling in your back, neck, jaw, or upper belly or in one or both shoulders or arms. ¨ Lightheadedness or sudden weakness. ¨ A fast or irregular heartbeat.   After you call 911, the  may tell you to chew 1 adult-strength or 2 to 4 low-dose aspirin. Wait for an ambulance. Do not try to drive yourself.    Call your doctor today if:    · You have any trouble breathing.     · Your chest pain gets worse.     · You are dizzy or lightheaded, or you feel like you may faint.     · You are not getting better as expected.     · You are having new or different chest pain. Where can you learn more? Go to http://adamaris-liana.info/. Enter A120 in the search box to learn more about \"Chest Pain: Care Instructions. \"  Current as of: November 20, 2017  Content Version: 11.7  © 4071-9932 [employer]. Care instructions adapted under license by [employer] (which disclaims liability or warranty for this information). If you have questions about a medical condition or this instruction, always ask your healthcare professional. [address] any warranty or liability for your use of this information.

## 2018-09-21 NOTE — ED NOTES
The patient was discharged home by Dr Terrence Rich in stable condition. The patient is alert and oriented, in no respiratory distress and discharge vital signs obtained. The patient's diagnosis, condition and treatment were explained. The patient expressed understanding. No prescriptions given. No work/school note given. A discharge plan has been developed. A  was not involved in the process. Aftercare instructions were given. Pt ambulatory out of the ED with family.

## 2018-09-21 NOTE — ED NOTES
7:15 PM 
Change of shift. Care of patient taken over from Dr. Deborah Carlos; H&P reviewed, bedside handoff complete. Awaiting Labs, CTA chest.  Advised by RN that labs drawn but patient is difficult IV access. Requesting US guided IV by MD.  Discussed with Dr Deborah Carlos. I will attempt US PIV, he also suggested adding on DDimer and discontinuing CTA if normal.   
 
8:45 PM 
Attempted PIV by US but no adequate vessels identified. Considered PIV in EJ but CT will not do CTA in EJ. Will add on Ddimer. If normal, D/C CTA, if abnormal will required Central line placement. 9:25 PM 
Ddimer resulted and is normal.  Other labs normal.  Will discharge home.

## 2018-10-17 ENCOUNTER — HOSPITAL ENCOUNTER (EMERGENCY)
Age: 43
Discharge: HOME | End: 2018-10-17
Attending: EMERGENCY MEDICINE
Payer: MEDICAID

## 2018-10-17 VITALS
BODY MASS INDEX: 31.07 KG/M2 | TEMPERATURE: 98.6 F | HEART RATE: 111 BPM | SYSTOLIC BLOOD PRESSURE: 145 MMHG | WEIGHT: 197.97 LBS | RESPIRATION RATE: 16 BRPM | HEIGHT: 67 IN | OXYGEN SATURATION: 100 % | DIASTOLIC BLOOD PRESSURE: 90 MMHG

## 2018-10-17 DIAGNOSIS — M75.52 CHRONIC SHOULDER BURSITIS, LEFT: Primary | ICD-10-CM

## 2018-10-17 PROCEDURE — 74011250637 HC RX REV CODE- 250/637: Performed by: EMERGENCY MEDICINE

## 2018-10-17 PROCEDURE — 99283 EMERGENCY DEPT VISIT LOW MDM: CPT

## 2018-10-17 RX ORDER — LIDOCAINE 50 MG/G
PATCH TOPICAL
Qty: 30 EACH | Refills: 0 | Status: SHIPPED | OUTPATIENT
Start: 2018-10-17 | End: 2019-02-05

## 2018-10-17 RX ORDER — HYDROCODONE BITARTRATE AND ACETAMINOPHEN 7.5; 325 MG/1; MG/1
1 TABLET ORAL
Qty: 10 TAB | Refills: 0 | Status: SHIPPED | OUTPATIENT
Start: 2018-10-17 | End: 2018-11-06

## 2018-10-17 RX ORDER — HYDROCODONE BITARTRATE AND ACETAMINOPHEN 7.5; 325 MG/1; MG/1
1 TABLET ORAL
Status: COMPLETED | OUTPATIENT
Start: 2018-10-17 | End: 2018-10-17

## 2018-10-17 RX ADMIN — HYDROCODONE BITARTRATE AND ACETAMINOPHEN 1 TABLET: 7.5; 325 TABLET ORAL at 12:18

## 2018-10-17 NOTE — DISCHARGE INSTRUCTIONS
Shoulder Bursitis: Care Instructions  Your Care Instructions    Bursitis is inflammation of the bursa. A bursa is a small sac of fluid that cushions a joint and helps it move easily. A bursa sits under the highest point of your shoulder. You can get bursitis by overusing your shoulder, which can happen with activities such as lifting, pitching a ball, or painting. Symptoms of bursitis include pain when you move your arm. Your arm may hurt when you try to lift it, and the pain can reach down the side of your arm. You may have trouble sleeping because of the pain. Bursitis usually gets better if you avoid the activity that caused it. If pain lasts or gets worse despite home treatment, your doctor may draw fluid from the bursa through a needle. This may relieve your pain and help your doctor know if you have an infection. If so, your doctor will prescribe antibiotics. If you have inflammation only, you may get a corticosteroid shot to reduce swelling and pain. Sometimes surgery is needed to drain or remove the bursa. Follow-up care is a key part of your treatment and safety. Be sure to make and go to all appointments, and call your doctor if you are having problems. It's also a good idea to know your test results and keep a list of the medicines you take. How can you care for yourself at home? · Put ice or a cold pack on your shoulder for 10 to 20 minutes at a time. Put a thin cloth between the ice and your skin. · After 3 days of using ice, use heat on your shoulder. You can use a hot water bottle, a heating pad set on low, or a warm, moist towel. Some doctors suggest alternating between hot and cold. · Rest your shoulder. Stop any activities that cause pain. Switch to activities that do not stress your shoulder. · Take your medicines exactly as prescribed. Call your doctor if you think you are having a problem with your medicine.   · If your doctor recommends it, take anti-inflammatory medicines to reduce pain. These include ibuprofen (Advil, Motrin) and naproxen (Aleve). Read and follow all instructions on the label. · To prevent stiffness, gently move the shoulder joint through its full range of motion. As the pain gets better, keep doing range-of-motion exercises. Ask your doctor for exercises that will make the muscles around the shoulder joint stronger. Do these as directed. · You can slowly return to the activity that caused the pain, but do it with less effort until you can do it without pain or swelling. Be sure to warm up before and stretch after you do the activity. When should you call for help? Call your doctor now or seek immediate medical care if:    · You have a fever.     · You have increased swelling or redness in your shoulder.     · You cannot use your shoulder, or the pain in your shoulder gets worse.    Watch closely for changes in your health, and be sure to contact your doctor if:    · You have pain for 2 weeks or longer despite home treatment. Where can you learn more? Go to http://adamaris-liana.info/. Enter M955 in the search box to learn more about \"Shoulder Bursitis: Care Instructions. \"  Current as of: November 29, 2017  Content Version: 11.8  © 2585-8685 [employer]. Care instructions adapted under license by [employer] (which disclaims liability or warranty for this information). If you have questions about a medical condition or this instruction, always ask your healthcare professional. [place] [address] any warranty or liability for your use of this information. Shoulder Bursitis: Exercises  Your Care Instructions  Here are some examples of typical rehabilitation exercises for your condition. Start each exercise slowly. Ease off the exercise if you start to have pain. Your doctor or physical therapist will tell you when you can start these exercises and which ones will work best for you.   How to do the exercises  Posterior stretching exercise    1. Hold the elbow of your injured arm with your other hand. 2. Use your hand to pull your injured arm gently up and across your body. You will feel a gentle stretch across the back of your injured shoulder. 3. Hold for at least 15 to 30 seconds. Then slowly lower your arm. 4. Repeat 2 to 4 times. Up-the-back stretch    1. Put your hand in your back pocket. Let it rest there to stretch your shoulder. 2. With your other hand, hold your injured arm (palm outward) behind your back by the wrist. Pull your arm up gently to stretch your shoulder. 3. Next, put a towel over your other shoulder. Put the hand of your injured arm behind your back. Now hold the back end of the towel. With the other hand, hold the front end of the towel in front of your body. Pull gently on the front end of the towel. This will bring your hand farther up your back to stretch your shoulder. Overhead stretch    1. Standing about an arm's length away, grasp onto a solid surface. You could use a countertop, a doorknob, or the back of a sturdy chair. 2. With your knees slightly bent, bend forward with your arms straight. Lower your upper body, and let your shoulders stretch. 3. As your shoulders are able to stretch farther, you may need to take a step or two backward. 4. Hold for at least 15 to 30 seconds. Then stand up and relax. If you had stepped back during your stretch, step forward so you can keep your hands on the solid surface. 5. Repeat 2 to 4 times. Shoulder flexion (lying down)    1. Lie on your back, holding a wand with both hands. Your palms should face down as you hold the wand. 2. Keeping your elbows straight, slowly raise your arms over your head. Raise them until you feel a stretch in your shoulders, upper back, and chest.  3. Hold for 15 to 30 seconds. 4. Repeat 2 to 4 times. Shoulder rotation (lying down)    1. Lie on your back.  Hold a wand with both hands with your elbows bent and palms up. 2. Keep your elbows close to your body, and move the wand across your body toward the sore arm. 3. Hold for 8 to 12 seconds. 4. Repeat 2 to 4 times. Shoulder blade squeeze    1. Stand with your arms at your sides, and squeeze your shoulder blades together. Do not raise your shoulders up as you squeeze. 2. Hold 6 seconds. 3. Repeat 8 to 12 times. Shoulder flexor and extensor exercise    1. Push forward (flex): Stand facing a wall or doorjamb, about 6 inches or less back. Hold your injured arm against your body. Make a closed fist with your thumb on top. Then gently push your hand forward into the wall with about 25% to 50% of your strength. Don't let your body move backward as you push. Hold for about 6 seconds. Relax for a few seconds. Repeat 8 to 12 times. 2. Push backward (extend): Stand with your back flat against a wall. Your upper arm should be against the wall, with your elbow bent 90 degrees (your hand straight ahead). Push your elbow gently back against the wall with about 25% to 50% of your strength. Don't let your body move forward as you push. Hold for about 6 seconds. Relax for a few seconds. Repeat 8 to 12 times. Scapular exercise: Wall push-ups    1. Stand facing a wall, about 12 inches to 18 inches away. 2. Place your hands on the wall at shoulder height. 3. Slowly bend your elbows and bring your face to the wall. Keep your back and hips straight. 4. Push back to where you started. 5. Repeat 8 to 12 times. 6. When you can do this exercise against a wall comfortably, you can try it against a counter. You can then slowly progress to the end of a couch, then to a sturdy chair, and finally to the floor. Scapular exercise: Retraction    1. Put the band around a solid object at about waist level. (A bedpost will work well.) Each hand should hold an end of the band. 2. With your elbows at your sides and bent to 90 degrees, pull the band back.  Your shoulder blades should move toward each other. Then move your arms back where you started. 3. Repeat 8 to 12 times. 4. If you have good range of motion in your shoulders, try this exercise with your arms lifted out to the sides. Keep your elbows at a 90-degree angle. Raise the elastic band up to about shoulder level. Pull the band back to move your shoulder blades toward each other. Then move your arms back where you started. Internal rotator strengthening exercise    1. Start by tying a piece of elastic exercise material to a doorknob. You can use surgical tubing or Thera-Band. 2. Stand or sit with your shoulder relaxed and your elbow bent 90 degrees. Your upper arm should rest comfortably against your side. Squeeze a rolled towel between your elbow and your body for comfort. This will help keep your arm at your side. 3. Hold one end of the elastic band in the hand of the painful arm. 4. Slowly rotate your forearm toward your body until it touches your belly. Slowly move it back to where you started. 5. Keep your elbow and upper arm firmly tucked against the towel roll or at your side. 6. Repeat 8 to 12 times. External rotator strengthening exercise    1. Start by tying a piece of elastic exercise material to a doorknob. You can use surgical tubing or Thera-Band. (You may also hold one end of the band in each hand.)  2. Stand or sit with your shoulder relaxed and your elbow bent 90 degrees. Your upper arm should rest comfortably against your side. Squeeze a rolled towel between your elbow and your body for comfort. This will help keep your arm at your side. 3. Hold one end of the elastic band with the hand of the painful arm. 4. Start with your forearm across your belly. Slowly rotate the forearm out away from your body. Keep your elbow and upper arm tucked against the towel roll or the side of your body until you begin to feel tightness in your shoulder.  Slowly move your arm back to where you started. 5. Repeat 8 to 12 times. Follow-up care is a key part of your treatment and safety. Be sure to make and go to all appointments, and call your doctor if you are having problems. It's also a good idea to know your test results and keep a list of the medicines you take. Where can you learn more? Go to http://adamaris-liana.info/. Enter A715 in the search box to learn more about \"Shoulder Bursitis: Exercises. \"  Current as of: November 29, 2017  Content Version: 11.8  © 4898-4269 [employer]. Care instructions adapted under license by [employer] (which disclaims liability or warranty for this information). If you have questions about a medical condition or this instruction, always ask your healthcare professional. [address] any warranty or liability for your use of this information.

## 2018-10-17 NOTE — ED TRIAGE NOTES
Pt reports having left upper arm pain that began a month ago. She saw her PCP and was diagnosed with bursitis and tendonitis and was prescribed a muscle relaxer which has not provided relief.

## 2018-10-17 NOTE — ED PROVIDER NOTES
The history is provided by the patient. Shoulder Pain Incident onset: recurrent and chronic. There was no injury mechanism. The left shoulder is affected. The pain is severe. patient with hx of shoulder issues and has been to ortho and PT for this Past Medical History:  
Diagnosis Date  Anxiety  Arthritis  Autoimmune disease ([facility] [address]  Bronchitis  CAD (coronary artery disease)  Chronic pain   
 fibromyalgia  Diabetes ([facility] [address]  Fibromyalgia  GERD (gastroesophageal reflux disease)  H/O pericarditis  High cholesterol  History of continuous positive airway pressure (CPAP) therapy  Hypertension  Irregular heart beat  Morbid obesity ([facility] [address]  Other ill-defined conditions(799.89)   
 sleep apnea  Thromboembolus ([facility] [address] 2010 Leg DVT  Unspecified sleep apnea   
 cpap Past Surgical History:  
Procedure Laterality Date  HX HEENT  2009  
 tonsillectomy  HX ORTHOPAEDIC  2010  
 right knee patellar surgery with hardware  HX IRVING AND BSO  2005  
 hysterectomy Family History:  
Problem Relation Age of Onset  Diabetes Mother  Hypertension Mother  Heart Disease Mother  Diabetes Father  Hypertension Father  Heart Disease Father  Diabetes Sister  Diabetes Sister  Hypertension Sister Social History Socioeconomic History  Marital status:  Spouse name: Not on file  Number of children: Not on file  Years of education: Not on file  Highest education level: Not on file Social Needs  Financial resource strain: Not on file  Food insecurity - worry: Not on file  Food insecurity - inability: Not on file  Transportation needs - medical: Not on file  Transportation needs - non-medical: Not on file Occupational History  Not on file Tobacco Use  Smoking status: Never Smoker  Smokeless tobacco: Never Used Substance and Sexual Activity  Alcohol use:  No  
  Drug use: No  
 Sexual activity: Not Currently Other Topics Concern  Not on file Social History Narrative  Not on file ALLERGIES: Aspirin; Nsaids (non-steroidal anti-inflammatory drug); and Tramadol Review of Systems Constitutional: Negative for chills and fever. Respiratory: Negative for chest tightness and shortness of breath. Musculoskeletal:  
     Left shoulder pain No neck pain No arm swelling Vitals:  
 10/17/18 1201 10/17/18 1209 BP: (!) 135/101 Pulse: (!) 119 Resp: 16 Temp: 98.6 °F (37 °C) SpO2: 100% Weight:  89.8 kg (197 lb 15.6 oz) Height:  5' 7\" (1.702 m) Physical Exam  
Constitutional: She appears well-developed. Cardiovascular: Normal rate and intact distal pulses. Pulmonary/Chest: Effort normal. No respiratory distress. Musculoskeletal: She exhibits tenderness. She exhibits no deformity. Left shoulder: She exhibits tenderness and pain. She exhibits no bony tenderness, no swelling, no effusion, no deformity and normal pulse. Skin: Capillary refill takes less than 2 seconds. Psychiatric: She has a normal mood and affect. Nursing note and vitals reviewed. MDM Number of Diagnoses or Management Options Chronic shoulder bursitis, left:  
Diagnosis management comments: Recurrent vs chronic left shoulder pain - give rx for analgesia and refer back to ortho Procedures

## 2018-10-17 NOTE — ED NOTES
The patient was discharged home by Dr. Antoni Carlisle  in stable condition. The patient is alert and oriented, in no respiratory distress and discharge vital signs obtained. The patient's diagnosis, condition and treatment were explained. The patient expressed understanding. Two prescriptions given. No work/school note given. A discharge plan has been developed. A  was not involved in the process. Aftercare instructions were given. Pt ambulatory out of the ED with family.

## 2018-11-06 ENCOUNTER — HOSPITAL ENCOUNTER (EMERGENCY)
Age: 43
Discharge: HOME | End: 2018-11-06
Attending: EMERGENCY MEDICINE
Payer: MEDICAID

## 2018-11-06 VITALS
SYSTOLIC BLOOD PRESSURE: 117 MMHG | BODY MASS INDEX: 36.99 KG/M2 | HEIGHT: 62 IN | OXYGEN SATURATION: 98 % | WEIGHT: 201 LBS | HEART RATE: 138 BPM | RESPIRATION RATE: 18 BRPM | TEMPERATURE: 96.8 F | DIASTOLIC BLOOD PRESSURE: 72 MMHG

## 2018-11-06 DIAGNOSIS — M25.512 CHRONIC LEFT SHOULDER PAIN: Primary | ICD-10-CM

## 2018-11-06 DIAGNOSIS — G89.29 CHRONIC LEFT SHOULDER PAIN: Primary | ICD-10-CM

## 2018-11-06 PROCEDURE — 99283 EMERGENCY DEPT VISIT LOW MDM: CPT

## 2018-11-06 PROCEDURE — 74011250637 HC RX REV CODE- 250/637: Performed by: EMERGENCY MEDICINE

## 2018-11-06 RX ORDER — METHOCARBAMOL 500 MG/1
1000 TABLET, FILM COATED ORAL ONCE
Status: COMPLETED | OUTPATIENT
Start: 2018-11-06 | End: 2018-11-06

## 2018-11-06 RX ORDER — METHOCARBAMOL 500 MG/1
500 TABLET, FILM COATED ORAL
Qty: 30 TAB | Refills: 0 | Status: SHIPPED | OUTPATIENT
Start: 2018-11-06 | End: 2019-02-05

## 2018-11-06 RX ADMIN — METHOCARBAMOL TABLETS 1000 MG: 500 TABLET, COATED ORAL at 13:37

## 2018-11-06 NOTE — ED PROVIDER NOTES
HPI  
41 y.o. female with chronic left shoulder pain which has been presents for \"several months\", thought to be bursitis of her shoulder vs possible rotator cuff injury, who has a appt with orthopedics on 11/12 for further evaluation, as well as scheduled for outpatient MRI later this month, presents to the ED noting chronic pain unalleviated by home tylenol dosing. She states that she has taken muscle relaxers in the past and that helped her sx, also, she has taken norco in the past. She denies any significant change from her baseline pain, no recent falls, fever, redness, change in her physical activity level or new joint pain or injury. She states that it \"feels tight in her shoulder when she lifts her arm. \" No chest pain, SOB, N, V, D, abd pain, or other acute sx. Past Medical History:  
Diagnosis Date  Anxiety  Arthritis  Autoimmune disease ([address]  Bronchitis  Bursitis of shoulder, left  CAD (coronary artery disease)  Chronic pain   
 fibromyalgia  Diabetes ([address]  Fibromyalgia  GERD (gastroesophageal reflux disease)  H/O pericarditis  High cholesterol  History of continuous positive airway pressure (CPAP) therapy  Hypertension  Irregular heart beat  Morbid obesity ([address]  Other ill-defined conditions(799.89)   
 sleep apnea  Thromboembolus ([address] 2010 Leg DVT  Unspecified sleep apnea   
 cpap Past Surgical History:  
Procedure Laterality Date  HX HEENT  2009  
 tonsillectomy  HX ORTHOPAEDIC  2010  
 right knee patellar surgery with hardware  HX IRVING AND BSO  2005  
 hysterectomy Family History:  
Problem Relation Age of Onset  Diabetes Mother  Hypertension Mother  Heart Disease Mother  Diabetes Father  Hypertension Father  Heart Disease Father  Diabetes Sister  Diabetes Sister  Hypertension Sister Social History Socioeconomic History  Marital status:   
 Spouse name: Not on file  Number of children: Not on file  Years of education: Not on file  Highest education level: Not on file Social Needs  Financial resource strain: Not on file  Food insecurity - worry: Not on file  Food insecurity - inability: Not on file  Transportation needs - medical: Not on file  Transportation needs - non-medical: Not on file Occupational History  Not on file Tobacco Use  Smoking status: Never Smoker  Smokeless tobacco: Never Used Substance and Sexual Activity  Alcohol use: No  
 Drug use: No  
 Sexual activity: Not Currently Other Topics Concern  Not on file Social History Narrative  Not on file ALLERGIES: Aspirin; Nsaids (non-steroidal anti-inflammatory drug); and Tramadol Review of Systems Constitutional: Positive for activity change. Negative for appetite change, chills and fever. HENT: Negative for congestion, rhinorrhea, sinus pressure, sneezing and sore throat. Eyes: Negative for photophobia and visual disturbance. Respiratory: Negative for cough and shortness of breath. Cardiovascular: Negative for chest pain. Gastrointestinal: Negative for abdominal pain, blood in stool, constipation, diarrhea, nausea and vomiting. Genitourinary: Negative for difficulty urinating, dysuria, flank pain, frequency, hematuria, menstrual problem and urgency. Musculoskeletal: Positive for arthralgias (left shoulder). Negative for back pain, gait problem, joint swelling, myalgias, neck pain and neck stiffness. Skin: Negative for rash and wound. Neurological: Negative for syncope, weakness, numbness and headaches. Psychiatric/Behavioral: Negative for self-injury and suicidal ideas. All other systems reviewed and are negative. Vitals:  
 11/06/18 1320 BP: 117/72 Pulse: (!) 138 Resp: 18 Temp: 96.8 °F (36 °C) SpO2: 98% Weight: 91.2 kg (201 lb) Height: 5' 2\" (1.575 m) Physical Exam  
 Constitutional: She is oriented to person, place, and time. She appears well-developed and well-nourished. No distress. HENT:  
Head: Normocephalic and atraumatic. Nose: Nose normal.  
Mouth/Throat: Oropharynx is clear and moist.  
Eyes: Conjunctivae and EOM are normal. Pupils are equal, round, and reactive to light. Neck: Neck supple. Cardiovascular: Normal rate, regular rhythm, normal heart sounds and intact distal pulses. Pulmonary/Chest: Effort normal and breath sounds normal.  
Abdominal: Soft. She exhibits no distension. There is no tenderness. Musculoskeletal: She exhibits no edema. Left shoulder: She exhibits decreased range of motion, tenderness, pain and spasm. She exhibits no swelling, no crepitus, no deformity, no laceration, normal pulse and normal strength. No bony abnormalities or evidence of trauma. Neg drop test. Noted muscle tightness around left shoulder. Neurological: She is alert and oriented to person, place, and time. She has normal strength. No cranial nerve deficit or sensory deficit. Coordination normal. GCS eye subscore is 4. GCS verbal subscore is 5. GCS motor subscore is 6. Skin: Skin is warm and dry. She is not diaphoretic. Nursing note and vitals reviewed. MDM 
  41 y.o. female presents with chronic left shoulder pain. No trauma or change in her sx. Scheduled for MRI and ortho f/u later this month. She states that she has had multiple joint injections and PT previously for it. Do not feel that imaging is necessary at this time, feel these are chronic sx and appropriate for outpatient imaging as scheduled. She was rx'd robaxin for further care and sx relief. rec'd f/u with ortho as scheduled. Return precautions were given for worsening or concerns. This plan was discussed with the patient at the bedside and they stated both understanding and agreement. Procedures

## 2018-11-06 NOTE — ED NOTES
The patient was discharged home by Dr. Villatoro Stage in stable condition. The patient is alert and oriented, in no respiratory distress. The patient's diagnosis, condition and treatment were explained. The patient expressed understanding. One prescriptions given. No work/school note given. A discharge plan has been developed. A  was not involved in the process. Aftercare instructions were given. Pt ambulatory out of the ED.

## 2018-11-06 NOTE — ED TRIAGE NOTES
Patient presents ambulatory to treatment area with a steady gait. Patient complains of left shoulder pain and swelling that began two weeks PTA. Patient has been seen at this facility previously for shoulder pain. Patient states \"I can't even lift it up. \"  Patient states she has been taking Tylenol at home without relief of symptoms. Patient has appointment with Ortho on the 12th of November.

## 2018-11-12 ENCOUNTER — HOSPITAL ENCOUNTER (OUTPATIENT)
Dept: MRI IMAGING | Age: 43
Discharge: HOME | End: 2018-11-12
Attending: FAMILY MEDICINE
Payer: MEDICAID

## 2018-11-12 DIAGNOSIS — M75.42 IMPINGEMENT SYNDROME OF LEFT SHOULDER: ICD-10-CM

## 2018-11-12 DIAGNOSIS — M75.52 BURSITIS OF LEFT SHOULDER: ICD-10-CM

## 2018-11-12 DIAGNOSIS — M77.8 TENDINITIS OF LEFT SHOULDER: ICD-10-CM

## 2018-11-12 PROCEDURE — 73221 MRI JOINT UPR EXTREM W/O DYE: CPT

## 2019-02-05 ENCOUNTER — HOSPITAL ENCOUNTER (EMERGENCY)
Age: 44
Discharge: HOME | End: 2019-02-05
Attending: EMERGENCY MEDICINE
Payer: MEDICAID

## 2019-02-05 ENCOUNTER — APPOINTMENT (OUTPATIENT)
Dept: GENERAL RADIOLOGY | Age: 44
End: 2019-02-05
Attending: NURSE PRACTITIONER
Payer: MEDICAID

## 2019-02-05 VITALS
BODY MASS INDEX: 32.77 KG/M2 | HEART RATE: 99 BPM | DIASTOLIC BLOOD PRESSURE: 112 MMHG | SYSTOLIC BLOOD PRESSURE: 168 MMHG | RESPIRATION RATE: 15 BRPM | OXYGEN SATURATION: 100 % | HEIGHT: 67 IN | TEMPERATURE: 98.4 F | WEIGHT: 208.78 LBS

## 2019-02-05 DIAGNOSIS — M75.02 ADHESIVE CAPSULITIS OF LEFT SHOULDER: ICD-10-CM
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
## 2019-02-05 DIAGNOSIS — M25.512 LEFT SHOULDER PAIN, UNSPECIFIED CHRONICITY: Primary | ICD-10-CM

## 2019-02-05 PROCEDURE — 99283 EMERGENCY DEPT VISIT LOW MDM: CPT

## 2019-02-05 PROCEDURE — 73030 X-RAY EXAM OF SHOULDER: CPT

## 2019-02-05 PROCEDURE — 74011250637 HC RX REV CODE- 250/637: Performed by: NURSE PRACTITIONER

## 2019-02-05 RX ORDER — ACETAMINOPHEN AND CODEINE PHOSPHATE 300; 30 MG/1; MG/1
1 TABLET ORAL
COMMUNITY

## 2019-02-05 RX ORDER — HYDROCODONE BITARTRATE AND ACETAMINOPHEN 5; 325 MG/1; MG/1
1 TABLET ORAL
Qty: 4 TAB | Refills: 0 | Status: SHIPPED | OUTPATIENT
Start: 2019-02-05

## 2019-02-05 RX ORDER — HYDROCODONE BITARTRATE AND ACETAMINOPHEN 5; 325 MG/1; MG/1
1 TABLET ORAL
Status: COMPLETED | OUTPATIENT
Start: 2019-02-05 | End: 2019-02-05

## 2019-02-05 RX ADMIN — HYDROCODONE BITARTRATE AND ACETAMINOPHEN 1 TABLET: 5; 325 TABLET ORAL at 16:45

## 2019-02-05 NOTE — ED PROVIDER NOTES
Patient is a 53-year-old female with a past medical history significant for anxiety, arthritis, left shoulder bursitis, coronary artery disease, fibromyalgia, diabetes, GERD, pericarditis, dyslipidemia, obstructive sleep apnea on CPAP, hypertension, morbid obesity, DVT in her left leg who is ambulatory to the ED today from her primary care provider's office with complaints of acute on chronic left shoulder pain. Patient states she's been in pain in her left shoulder for the last year. She said several orders and injections without any relief of her symptoms. She notes that yesterday when trying to push her she'll follow up with the right arm she fell over onto her left shoulder. She saw her PCP today he sent her to the ER for evaluation due to swelling of the left shoulder. Patient denies fever, chills, nausea, vomiting, head strike or loss of consciousness. Patient is no further complaints at this time. Allergies reviewed. Patient is unable to take NSAIDs or Ultram. 
 
Primary care provider:Deanne Mccracken, BERT 
 
 reviewed. 63 Tylenol #3 filled since January 2, 2019 in addition to her regular clonidine and zolpidiem. The history is provided by the patient. No  was used. Past Medical History:  
Diagnosis Date  Anxiety  Arthritis  Autoimmune disease ([address]  Bronchitis  Bursitis of shoulder, left  CAD (coronary artery disease)  Chronic pain   
 fibromyalgia  Diabetes ([address]  Fibromyalgia  GERD (gastroesophageal reflux disease)  H/O pericarditis  High cholesterol  History of continuous positive airway pressure (CPAP) therapy  Hypertension  Irregular heart beat  Morbid obesity ([address]  Other ill-defined conditions(799.89)   
 sleep apnea  Thromboembolus ([address] 2010 Leg DVT  Unspecified sleep apnea   
 cpap Past Surgical History:  
Procedure Laterality Date  HX HEENT  2009  
 tonsillectomy  HX ORTHOPAEDIC  2010  
 right knee patellar surgery with hardware  HX IRVING AND BSO  2005  
 hysterectomy Family History:  
Problem Relation Age of Onset  Diabetes Mother  Hypertension Mother  Heart Disease Mother  Diabetes Father  Hypertension Father  Heart Disease Father  Diabetes Sister  Diabetes Sister  Hypertension Sister Social History Socioeconomic History  Marital status:  Spouse name: Not on file  Number of children: Not on file  Years of education: Not on file  Highest education level: Not on file Social Needs  Financial resource strain: Not on file  Food insecurity - worry: Not on file  Food insecurity - inability: Not on file  Transportation needs - medical: Not on file  Transportation needs - non-medical: Not on file Occupational History  Not on file Tobacco Use  Smoking status: Never Smoker  Smokeless tobacco: Never Used Substance and Sexual Activity  Alcohol use: No  
 Drug use: No  
 Sexual activity: Not Currently Other Topics Concern  Not on file Social History Narrative  Not on file ALLERGIES: Aspirin; Nsaids (non-steroidal anti-inflammatory drug); and Tramadol Review of Systems Constitutional: Negative for appetite change, chills and fever. HENT: Negative. Respiratory: Negative for cough, shortness of breath and wheezing. Cardiovascular: Negative for chest pain. Gastrointestinal: Negative for abdominal pain, constipation, diarrhea, nausea and vomiting. Genitourinary: Negative for dysuria and urgency. Musculoskeletal: Positive for arthralgias, joint swelling and myalgias. Negative for back pain. Skin: Negative for color change and rash. Neurological: Negative for dizziness and headaches. Psychiatric/Behavioral: Negative. All other systems reviewed and are negative. Vitals:  
 02/05/19 1616 BP: (!) 168/112 Pulse: 99 Resp: 15 SpO2: 100% Weight: 94.7 kg (208 lb 12.4 oz) Height: 5' 7\" (1.702 m) Physical Exam  
Constitutional: She appears well-developed and well-nourished. 41year old obese female in NAD HENT:  
Head: Atraumatic. Eyes: EOM are normal.  
Neck: No tracheal deviation present. Pulmonary/Chest: Effort normal. No respiratory distress. Musculoskeletal:  
     Left shoulder: She exhibits decreased range of motion, tenderness, bony tenderness, pain, spasm and decreased strength. She exhibits no swelling, no effusion, no crepitus, no deformity, no laceration and normal pulse. TTP across the mid-clavicle and proximal humerus. Decreased  strength to the ulnar side of the left hand. Neurological: She is alert. Skin: Skin is warm and dry. Psychiatric: She has a normal mood and affect. Her behavior is normal. Judgment and thought content normal.  
Nursing note and vitals reviewed. MDM Number of Diagnoses or Management Options Fall, initial encounter:  
Left shoulder pain, unspecified chronicity:  
Diagnosis management comments: LEFT SHOULDER MRI 11/2018: IMPRESSION:  
1. Edema of the glenohumeral capsule especially axillary pouch which can be seen 
with adhesive capsulitis. 2. Mild tendinopathy of the supraspinatus without tear Amount and/or Complexity of Data Reviewed Decide to obtain previous medical records or to obtain history from someone other than the patient: yes Review and summarize past medical records: yes Discuss the patient with other providers: yes Procedures Assessment & Plan:  
 
Orders Placed This Encounter  XR SHOULDER LEFT AP/LATERAL  
 HYDROcodone-acetaminophen (NORCO) 5-325 mg per tablet 1 Tab Discussed with Britta Adair MD,ED Provider Rachna Dillon NP 
02/05/19 
4:16 PM 
 
H/O Adhesive capsulitis of the left shoulder seen on MRI. No new acute findings on the films. Cannot take NSAIDS.  reviewed.  Has been receiving Tylenol #3 for pain. Will give small supply of Norco for pain. [facility]. Discussed return precautions. 4:58 PM 
The patient has been reevaluated. The patient is ready for discharge. The patient's signs, symptoms, diagnosis, and discharge instructions have been discussed and the patient/ family has conveyed their understanding. The patient is to follow up as recommended or return to the ED should their symptoms worsen. Plan has been discussed and the patient is in agreement. LABORATORY TESTS: 
Labs Reviewed - No data to display IMAGING RESULTS: 
Xr Shoulder Lt Ap/lat Min 2 V Result Date: 2/5/2019 EXAM: XR SHOULDER LT AP/LAT MIN 2 V INDICATION: fall onto the left shoulder. TTP across clavicle and proximal humerus. Acute on chronic shoulder pain. COMPARISON: August 22, 2018. FINDINGS: Three views of the left shoulder demonstrate no fracture, dislocation or other acute abnormality. IMPRESSION: No acute abnormality. MEDICATIONS GIVEN: 
Medications HYDROcodone-acetaminophen (NORCO) 5-325 mg per tablet 1 Tab (1 Tab Oral Given 2/5/19 0391) IMPRESSION: 
1. Left shoulder pain, unspecified chronicity 2. Fall, initial encounter 3. Adhesive capsulitis of left shoulder PLAN: 
1. Current Discharge Medication List  
  
START taking these medications Details HYDROcodone-acetaminophen (NORCO) 5-325 mg per tablet Take 1 Tab by mouth every eight (8) hours as needed for Pain. Max Daily Amount: 3 Tabs. Qty: 4 Tab, Refills: 0 Associated Diagnoses: Left shoulder pain, unspecified chronicity; Adhesive capsulitis of left shoulder 2. Follow-up Information Follow up With Specialties Details Why Contact Info [facility]  Schedule an appointment as soon as possible for a visit  [address] [place] [address] 
[phone]  Reid Howe NP Nurse Practitioner Schedule an appointment as soon as possible for a visit As needed [address] 
[phone] [facility] EMERGENCY DEPT Emergency Medicine  As needed, If symptoms worsen [address] [facility] 
[phone] 3. Return to ED for new or worsening symptoms Jose Angel Dangelo NP

## 2019-02-05 NOTE — ED TRIAGE NOTES
Patient has preexisting left shoulder pain from tendonitis over a year ago. Supposed to see an orthopedist in 3 months. Last night tried to lift herself out of the seat and fell onto the left shoulder because of prior pain in that arm. \"Tylenol #3 and Norco work best for the [employer]

## 2019-02-05 NOTE — ED NOTES
The patient was discharged home by David Morel NP in stable condition. The patient is alert and oriented, in no respiratory distress. The patient's diagnosis, condition and treatment were explained. The patient expressed understanding. A discharge plan has been developed. A  was not involved in the process. Aftercare instructions were given. Pt ambulatory out of the ED with family.

## 2019-02-05 NOTE — DISCHARGE INSTRUCTIONS
Thank you for allowing us to care for you today. Please follow-up with your Primary Care provider in the next 2-3 days if your symptoms do not improve. Plan for home:     Norco for pain every 8 hours. Do not take with Tylenol #3. You have been given a prescription for a small supply of narcotic pain medication. Narcotic pain medication is highly addictive. Please keep this medication in a safe place. You should only use this medication when in extreme pain. You should not drive or operate heavy machinery for 24 hours after taking this medication. This medication also has Tylenol in it. You should not take any additional Tylenol while on this medication that exceeds 3,000mg in a 24 hour period. Follow-up with Orthopedics for continued care of your frozen shoulder. Come back to the ER if you have worsening symptoms, fevers over 100.9, shaking chills, nausea or vomiting. Patient Education        Frozen Shoulder: Exercises  Your Care Instructions  Here are some examples of typical rehabilitation exercises for your condition. Start each exercise slowly. Ease off the exercise if you start to have pain. Your doctor or physical therapist will tell you when you can start these exercises and which ones will work best for you. How to do the exercises  Neck stretches    1. Look straight ahead, and tip your right ear to your right shoulder. Do not let your left shoulder rise up as you tip your head to the right. 2. Hold 15 to 30 seconds. 3. Tilt your head to the left. Do not let your right shoulder rise up as you tip your head to the left. 4. Hold for 15 to 30 seconds. 5. Repeat 2 to 4 times to each side. Shoulder rolls    1. Sit comfortably with your feet shoulder-width apart. You can also do this exercise while standing. 2. Roll your shoulders up, then back, and then down in a smooth, circular motion. 3. Repeat 2 to 4 times. Shoulder flexion (lying down)    1.  Lie on your back, holding a wand with your hands. Your palms should face down as you hold the wand. Place your hands slightly wider than your shoulders. 2. Keeping your elbows straight, slowly raise your arms over your head until you feel a stretch in your shoulders, upper back, and chest.  3. Hold 15 to 30 seconds. 4. Repeat 2 to 4 times. Shoulder rotation (lying down)    1. Lie on your back and hold a wand in both hands with your elbows bent and your palms up. 2. Keeping your elbows close to your body, move the wand across your body toward the arm that has pain. 3. Hold for 15 to 30 seconds. 4. Repeat 2 to 4 times. Shoulder internal rotation with towel    1. Roll up a towel lengthwise. Hold the towel above and behind your head with the arm that is not sore. 2. With your sore arm, reach behind your back and grasp the towel. 3. Using the arm above your head, pull the towel upward until you feel a stretch on the front and outside of your sore shoulder. 4. Hold 15 to 30 seconds. 5. Relax and move the towel back down to the starting position. 6. Repeat 2 to 4 times. Shoulder blade squeeze    1. While standing with your arms at your sides, squeeze your shoulder blades together. Do not raise your shoulders up as you are squeezing. 2. Hold for 6 seconds. 3. Repeat 8 to 12 times. Follow-up care is a key part of your treatment and safety. Be sure to make and go to all appointments, and call your doctor if you are having problems. It's also a good idea to know your test results and keep a list of the medicines you take. Where can you learn more? Go to http://adamaris-liana.info/. Enter R144 in the search box to learn more about \"Frozen Shoulder: Exercises. \"  Current as of: September 20, 2018  Content Version: 11.9  © 1893-2652 [employer]. Care instructions adapted under license by [employer] (which disclaims liability or warranty for this information).  If you have questions about a medical condition or this instruction, always ask your healthcare professional. [place] [address] any warranty or liability for your use of this information.

## 2024-07-07 NOTE — ED TRIAGE NOTES
Patient arrives via EMS with c/o left sided chest pain since yesterday. Denies radiation. + nausea. Denies vomiting. Both A and B./normal